# Patient Record
Sex: FEMALE | Race: WHITE | Employment: FULL TIME | URBAN - METROPOLITAN AREA
[De-identification: names, ages, dates, MRNs, and addresses within clinical notes are randomized per-mention and may not be internally consistent; named-entity substitution may affect disease eponyms.]

---

## 2017-10-14 ENCOUNTER — HOSPITAL ENCOUNTER (EMERGENCY)
Age: 27
Discharge: HOME | End: 2017-10-14
Attending: EMERGENCY MEDICINE
Payer: COMMERCIAL

## 2017-10-14 ENCOUNTER — APPOINTMENT (OUTPATIENT)
Dept: GENERAL RADIOLOGY | Age: 27
End: 2017-10-14
Attending: EMERGENCY MEDICINE
Payer: COMMERCIAL

## 2017-10-14 VITALS
HEART RATE: 80 BPM | HEIGHT: 64 IN | RESPIRATION RATE: 16 BRPM | WEIGHT: 230 LBS | DIASTOLIC BLOOD PRESSURE: 72 MMHG | SYSTOLIC BLOOD PRESSURE: 112 MMHG | TEMPERATURE: 97.8 F | OXYGEN SATURATION: 98 % | BODY MASS INDEX: 39.27 KG/M2

## 2017-10-14 DIAGNOSIS — M25.511 ACUTE PAIN OF RIGHT SHOULDER: Primary | ICD-10-CM

## 2017-10-14 DIAGNOSIS — T07.XXXA MULTIPLE ABRASIONS: ICD-10-CM

## 2017-10-14 PROCEDURE — 73030 X-RAY EXAM OF SHOULDER: CPT

## 2017-10-14 PROCEDURE — 74011250636 HC RX REV CODE- 250/636: Performed by: EMERGENCY MEDICINE

## 2017-10-14 PROCEDURE — L3670 SO ACRO/CLAV CAN WEB PRE OTS: HCPCS

## 2017-10-14 PROCEDURE — 90715 TDAP VACCINE 7 YRS/> IM: CPT | Performed by: EMERGENCY MEDICINE

## 2017-10-14 PROCEDURE — 99284 EMERGENCY DEPT VISIT MOD MDM: CPT | Performed by: EMERGENCY MEDICINE

## 2017-10-14 PROCEDURE — 90471 IMMUNIZATION ADMIN: CPT | Performed by: EMERGENCY MEDICINE

## 2017-10-14 RX ORDER — HYDROCODONE BITARTRATE AND ACETAMINOPHEN 5; 325 MG/1; MG/1
1 TABLET ORAL
Qty: 6 TAB | Refills: 0 | Status: SHIPPED | OUTPATIENT
Start: 2017-10-14 | End: 2017-10-16

## 2017-10-14 RX ADMIN — TETANUS TOXOID, REDUCED DIPHTHERIA TOXOID AND ACELLULAR PERTUSSIS VACCINE, ADSORBED 0.5 ML: 5; 2.5; 8; 8; 2.5 SUSPENSION INTRAMUSCULAR at 17:43
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
## 2017-10-14 NOTE — ED NOTES
I have reviewed discharge instructions with the patient. The patient verbalized understanding. Patient left ED via Discharge Method: ambulatory to Home with her friend Gomez rubio. Theo Silva Opportunity for questions and clarification provided. Patient given 1 scripts.

## 2017-10-14 NOTE — DISCHARGE INSTRUCTIONS
Musculoskeletal Pain: Care Instructions  Your Care Instructions  Different problems with the bones, muscles, nerves, ligaments, and tendons in the body can cause pain. One or more areas of your body may ache or burn. Or they may feel tired, stiff, or sore. The medical term for this type of pain is musculoskeletal pain. It can have many different causes. Sometimes the pain is caused by an injury such as a strain or sprain. Or you might have pain from using one part of your body in the same way over and over again. This is called overuse. In some cases, the cause of the pain is another health problem such as arthritis or fibromyalgia. The doctor will examine you and ask you questions about your health to help find the cause of your pain. Blood tests or imaging tests like an X-ray may also be helpful. But sometimes doctors can't find a cause of the pain. Treatment depends on your symptoms and the cause of the pain, if known. The doctor has checked you carefully, but problems can develop later. If you notice any problems or new symptoms, get medical treatment right away. Follow-up care is a key part of your treatment and safety. Be sure to make and go to all appointments, and call your doctor if you are having problems. It's also a good idea to know your test results and keep a list of the medicines you take. How can you care for yourself at home? · Rest until you feel better. · Do not do anything that makes the pain worse. Return to exercise gradually if you feel better and your doctor says it's okay. · Be safe with medicines. Read and follow all instructions on the label. ¨ If the doctor gave you a prescription medicine for pain, take it as prescribed. ¨ If you are not taking a prescription pain medicine, ask your doctor if you can take an over-the-counter medicine. · Put ice or a cold pack on the area for 10 to 20 minutes at a time to ease pain. Put a thin cloth between the ice and your skin.   When should you call for help? Call your doctor now or seek immediate medical care if:  · You have new pain, or your pain gets worse. · You have new symptoms such as a fever, a rash, or chills. Watch closely for changes in your health, and be sure to contact your doctor if:  · You do not get better as expected. Where can you learn more? Go to [employer]  Enter Q624 in the search box to learn more about \"Musculoskeletal Pain: Care Instructions. \"   © 3542-7686 [employer]. Care instructions adapted under license by [employer] (which disclaims liability or warranty for this information). This care instruction is for use with your licensed healthcare professional. If you have questions about a medical condition or this instruction, always ask your healthcare professional. [address] any warranty or liability for your use of this information. Content Version: 47.2.955092; Current as of: November 20, 2015             Joint Pain: Care Instructions  Your Care Instructions  Many people have small aches and pains from overuse or injury to muscles and joints. Joint injuries often happen during sports or recreation, work tasks, or projects around the home. An overuse injury can happen when you put too much stress on a joint or when you do an activity that stresses the joint over and over, such as using the computer or rowing a boat. You can take action at home to help your muscles and joints get better. You should feel better in 1 to 2 weeks, but it can take 3 months or more to heal completely. Follow-up care is a key part of your treatment and safety. Be sure to make and go to all appointments, and call your doctor if you are having problems. It's also a good idea to know your test results and keep a list of the medicines you take. How can you care for yourself at home? · Do not put weight on the injured joint for at least a day or two.   · For the first day or two after an injury, do not take hot showers or baths, and do not use hot packs. The heat could make swelling worse. · Put ice or a cold pack on the sore joint for 10 to 20 minutes at a time. Try to do this every 1 to 2 hours for the next 3 days (when you are awake) or until the swelling goes down. Put a thin cloth between the ice and your skin. · Wrap the injury in an elastic bandage. Do not wrap it too tightly because this can cause more swelling. · Prop up the sore joint on a pillow when you ice it or anytime you sit or lie down during the next 3 days. Try to keep it above the level of your heart. This will help reduce swelling. · Take an over-the-counter pain medicine, such as acetaminophen (Tylenol), ibuprofen (Advil, Motrin), or naproxen (Aleve). Read and follow all instructions on the label. · After 1 or 2 days of rest, begin moving the joint gently. While the joint is still healing, you can begin to exercise using activities that do not strain or hurt the painful joint. When should you call for help? Call your doctor now or seek immediate medical care if:  · You have signs of infection, such as:  ¨ Increased pain, swelling, warmth, and redness. ¨ Red streaks leading from the joint. ¨ A fever. Watch closely for changes in your health, and be sure to contact your doctor if:  · Your movement or symptoms are not getting better after 1 to 2 weeks of home treatment. Where can you learn more? Go to http://yovani-estrellita.info/. Enter P205 in the search box to learn more about \"Joint Pain: Care Instructions. \"  Current as of: March 21, 2017  Content Version: 11.3  © 7751-6203 [employer]. Care instructions adapted under license by [employer] (which disclaims liability or warranty for this information).  If you have questions about a medical condition or this instruction, always ask your healthcare professional. Pritesh Morrison disclaims any warranty or liability for your use of this information. Shoulder Pain: Care Instructions  Your Care Instructions    You can hurt your shoulder by using it too much during an activity, such as fishing or baseball. It can also happen as part of the everyday wear and tear of getting older. Shoulder injuries can be slow to heal, but your shoulder should get better with time. Your doctor may recommend a sling to rest your shoulder. If you have injured your shoulder, you may need testing and treatment. Follow-up care is a key part of your treatment and safety. Be sure to make and go to all appointments, and call your doctor if you are having problems. It's also a good idea to know your test results and keep a list of the medicines you take. How can you care for yourself at home? · Take pain medicines exactly as directed. ¨ If the doctor gave you a prescription medicine for pain, take it as prescribed. ¨ If you are not taking a prescription pain medicine, ask your doctor if you can take an over-the-counter medicine. ¨ Do not take two or more pain medicines at the same time unless the doctor told you to. Many pain medicines contain acetaminophen, which is Tylenol. Too much acetaminophen (Tylenol) can be harmful. · If your doctor recommends that you wear a sling, use it as directed. Do not take it off before your doctor tells you to. · Put ice or a cold pack on the sore area for 10 to 20 minutes at a time. Put a thin cloth between the ice and your skin. · If there is no swelling, you can put moist heat, a heating pad, or a warm cloth on your shoulder. Some doctors suggest alternating between hot and cold. · Rest your shoulder for a few days. If your doctor recommends it, you can then begin gentle exercise of the shoulder, but do not lift anything heavy. When should you call for help? Call 911 anytime you think you may need emergency care. For example, call if:  · You have chest pain or pressure.  This may occur with:  ¨ Sweating. ¨ Shortness of breath. ¨ Nausea or vomiting. ¨ Pain that spreads from the chest to the neck, jaw, or one or both shoulders or arms. ¨ Dizziness or lightheadedness. ¨ A fast or uneven pulse. After calling 911, chew 1 adult-strength aspirin. Wait for an ambulance. Do not try to drive yourself. · Your arm or hand is cool or pale or changes color. Call your doctor now or seek immediate medical care if:  · You have signs of infection, such as:  ¨ Increased pain, swelling, warmth, or redness in your shoulder. ¨ Red streaks leading from a place on your shoulder. ¨ Pus draining from an area of your shoulder. ¨ Swollen lymph nodes in your neck, armpits, or groin. ¨ A fever. Watch closely for changes in your health, and be sure to contact your doctor if:  · You cannot use your shoulder. · Your shoulder does not get better as expected. Where can you learn more? Go to http://yovani-estrellita.info/. Enter S001 in the search box to learn more about \"Shoulder Pain: Care Instructions. \"  Current as of: March 21, 2017  Content Version: 11.3  © 8434-5847 [employer]. Care instructions adapted under license by [employer] (which disclaims liability or warranty for this information). If you have questions about a medical condition or this instruction, always ask your healthcare professional. [place] [address] any warranty or liability for your use of this information.

## 2018-04-11 PROBLEM — J30.9 ALLERGIC RHINITIS: Status: ACTIVE | Noted: 2018-04-11

## 2018-04-11 PROBLEM — K21.9 GERD WITHOUT ESOPHAGITIS: Status: ACTIVE | Noted: 2018-04-11

## 2018-04-11 PROBLEM — F17.200 TOBACCO DEPENDENCE: Status: ACTIVE | Noted: 2018-04-11

## 2018-04-11 PROBLEM — E66.01 OBESITY, MORBID (HCC): Status: ACTIVE | Noted: 2018-04-11

## 2020-07-22 ENCOUNTER — HOSPITAL ENCOUNTER (EMERGENCY)
Age: 30
Discharge: HOME | End: 2020-07-22
Attending: EMERGENCY MEDICINE
Payer: SUBSIDIZED

## 2020-07-22 VITALS
SYSTOLIC BLOOD PRESSURE: 148 MMHG | RESPIRATION RATE: 16 BRPM | OXYGEN SATURATION: 99 % | HEART RATE: 87 BPM | WEIGHT: 274 LBS | HEIGHT: 65 IN | BODY MASS INDEX: 45.65 KG/M2 | TEMPERATURE: 98.1 F | DIASTOLIC BLOOD PRESSURE: 85 MMHG

## 2020-07-22 DIAGNOSIS — H61.21 IMPACTED CERUMEN OF RIGHT EAR: Primary | ICD-10-CM

## 2020-07-22 LAB
ANION GAP SERPL CALC-SCNC: 5 MMOL/L (ref 7–16)
BASOPHILS # BLD: 0.1 K/UL (ref 0–0.2)
BASOPHILS NFR BLD: 1 % (ref 0–2)
BUN SERPL-MCNC: 6 MG/DL (ref 6–23)
CALCIUM SERPL-MCNC: 9 MG/DL (ref 8.3–10.4)
CHLORIDE SERPL-SCNC: 105 MMOL/L (ref 98–107)
CO2 SERPL-SCNC: 28 MMOL/L (ref 21–32)
CREAT SERPL-MCNC: 0.75 MG/DL (ref 0.6–1)
DIFFERENTIAL METHOD BLD: ABNORMAL
EOSINOPHIL # BLD: 0.3 K/UL (ref 0–0.8)
EOSINOPHIL NFR BLD: 2 % (ref 0.5–7.8)
ERYTHROCYTE [DISTWIDTH] IN BLOOD BY AUTOMATED COUNT: 12.9 % (ref 11.9–14.6)
GLUCOSE SERPL-MCNC: 79 MG/DL (ref 65–100)
HCG UR QL: NEGATIVE
HCT VFR BLD AUTO: 44.3 % (ref 35.8–46.3)
HGB BLD-MCNC: 15.2 G/DL (ref 11.7–15.4)
IMM GRANULOCYTES # BLD AUTO: 0.1 K/UL (ref 0–0.5)
IMM GRANULOCYTES NFR BLD AUTO: 1 % (ref 0–5)
LYMPHOCYTES # BLD: 2.5 K/UL (ref 0.5–4.6)
LYMPHOCYTES NFR BLD: 22 % (ref 13–44)
MCH RBC QN AUTO: 28.8 PG (ref 26.1–32.9)
MCHC RBC AUTO-ENTMCNC: 34.3 G/DL (ref 31.4–35)
MCV RBC AUTO: 84.1 FL (ref 79.6–97.8)
MONOCYTES # BLD: 0.8 K/UL (ref 0.1–1.3)
MONOCYTES NFR BLD: 7 % (ref 4–12)
NEUTS SEG # BLD: 7.8 K/UL (ref 1.7–8.2)
NEUTS SEG NFR BLD: 68 % (ref 43–78)
NRBC # BLD: 0 K/UL (ref 0–0.2)
PLATELET # BLD AUTO: 335 K/UL (ref 150–450)
PMV BLD AUTO: 9.2 FL (ref 9.4–12.3)
POTASSIUM SERPL-SCNC: 3.6 MMOL/L (ref 3.5–5.1)
RBC # BLD AUTO: 5.27 M/UL (ref 4.05–5.2)
SODIUM SERPL-SCNC: 138 MMOL/L (ref 136–145)
WBC # BLD AUTO: 11.5 K/UL (ref 4.3–11.1)

## 2020-07-22 PROCEDURE — 85025 COMPLETE CBC W/AUTO DIFF WBC: CPT

## 2020-07-22 PROCEDURE — 99284 EMERGENCY DEPT VISIT MOD MDM: CPT

## 2020-07-22 PROCEDURE — 81025 URINE PREGNANCY TEST: CPT

## 2020-07-22 PROCEDURE — 75810000150 HC RMVL IMPACTED CERUMEN 1 / 2

## 2020-07-22 PROCEDURE — 81003 URINALYSIS AUTO W/O SCOPE: CPT

## 2020-07-22 PROCEDURE — 74011250636 HC RX REV CODE- 250/636: Performed by: EMERGENCY MEDICINE

## 2020-07-22 PROCEDURE — 80048 BASIC METABOLIC PNL TOTAL CA: CPT

## 2020-07-22 RX ADMIN — SODIUM CHLORIDE 500 ML: 900 INJECTION, SOLUTION INTRAVENOUS at 14:28

## 2020-07-22 NOTE — ED PROVIDER NOTES
The patient is a 82-year-old female presenting to the emergency department today complaining of inattention for the last week. The patient states that she is staring off into space and having trouble focusing. The patient says that she is aware of what is going on around her but it is just difficult to function. The patient says she is felt a little bit week. She has had ear infections in the past which have caused her balance to get off but she went to an urgent care 2 days ago and was evaluated and told that everything looked okay. The patient says she has not had a menstrual cycle for the last 3 months and is sexually active only with women. The patient says that for the last year she had had very irregular menstrual cycles but prior to that had a history of irregular menses. The patient says that she is not having any burning with urination or blood in her urine. She denies any abdominal pain nausea or vomiting. She does complain of some muscle strain in her neck from the way she sleeps which comes and goes but denies any fevers or chills. She works as a  and does not have any known known interactions with anyone who has been sick with coronavirus           Past Medical History:   Diagnosis Date    Cancer ([address] 2014    thyroid    Obesity (BMI 30-39. 9)     Pilonidal cyst        Past Surgical History:   Procedure Laterality Date    HX APPENDECTOMY  age 25, 2011?     HX HEENT  age 5    tubes in ears    HX HEENT  age15    wisdom teeth ext    HX OTHER SURGICAL  2016    pilonidal cyst removed         Family History:   Problem Relation Age of Onset    Diabetes Mother     Hypertension Mother     Lung Disease Mother     Hypertension Father     Cancer Maternal Grandmother         stomach    Cancer Paternal Grandfather         colon       Social History     Socioeconomic History    Marital status:      Spouse name: Not on file    Number of children: Not on file    Years of education: Not on file    Highest education level: Not on file   Occupational History    Not on file   Social Needs    Financial resource strain: Not on file    Food insecurity     Worry: Not on file     Inability: Not on file    Transportation needs     Medical: Not on file     Non-medical: Not on file   Tobacco Use    Smoking status: Current Every Day Smoker     Packs/day: 0.50     Years: 3.00     Pack years: 1.50    Smokeless tobacco: Never Used   Substance and Sexual Activity    Alcohol use: Yes     Alcohol/week: 0.0 standard drinks     Comment: rarely    Drug use: No    Sexual activity: Not on file   Lifestyle    Physical activity     Days per week: Not on file     Minutes per session: Not on file    Stress: Not on file   Relationships    Social connections     Talks on phone: Not on file     Gets together: Not on file     Attends Cheondoism service: Not on file     Active member of club or organization: Not on file     Attends meetings of clubs or organizations: Not on file     Relationship status: Not on file    Intimate partner violence     Fear of current or ex partner: Not on file     Emotionally abused: Not on file     Physically abused: Not on file     Forced sexual activity: Not on file   Other Topics Concern    Not on file   Social History Narrative    Not on file         ALLERGIES: Pcn [penicillins]; Amoxicillin; and Hydrocodone    Review of Systems   Constitutional: Positive for activity change and fatigue. Negative for chills and fever. HENT: Positive for ear pain. Eyes: Negative. Respiratory: Negative. Cardiovascular: Negative. Gastrointestinal: Negative. Genitourinary: Negative. Musculoskeletal: Negative. Skin: Negative. Neurological: Positive for dizziness. Hematological: Negative. All other systems reviewed and are negative.       Vitals:    07/22/20 1342   BP: 148/89   Pulse: 95   Resp: 16   Temp: 98.1 °F (36.7 °C)   SpO2: 97%   Weight: 124.3 kg (274 lb)   Height: 5' 5\" (1.651 m)            Physical Exam     GENERAL:The patient is obese, and well-hydrated. No acute distress  VITAL SIGNS: Heart rate, blood pressure, respiratory rate reviewed as recorded in  nurse's notes  EYES: Pupils reactive. Extraocular motion intact. No conjunctival redness or drainage. EARS: Cerumen impaction in the right ear with out ability to visualize the tympanic membrane. Left external auditory canal is clear with scant cerumen present. Tympanic membrane is clear with no retraction no erythema. NOSE: Bilateral erythema with thin drainage appreciated. No epistaxis present  MOUTH: Uvula is midline and mildly edematous. Floor the mouth is soft and there is no tonsillar enlargement present. NECK: Positive tenderness to palpation with enlargement of the right submandibular lymph node. Trachea midline. LUNGS: No accessory muscle use  CARDIOVASCULAR: Regular rate and rhythm  EXTREMITIES: Pt moving all 4 extremities with out limitations. Normal muscle tone. NEUROLOGIC: Cranial nerve exam reveals face is symmetrical, tongue is midline  speech is clear. No focal deficits noted. Negative pronator drift in the arms and legs. Patient is able do finger-to-nose without difficulty. SKIN: No rash or petechiae. Good skin turgor palpated. PSYCHIATRIC: Alert and oriented. Appropriate behavior and judgment.       MDM  Number of Diagnoses or Management Options  Diagnosis management comments: Viral infection, croup (bronchiolitis), mononucleosis    Acute sinusitis, chronic sinusitis,    OM, serous OM, otitis externa,    Pharyngitis, Strep Throat, adenitis, uvulitis, rhinitis, postnasal drainage, nasal congestion    Bronchitis, pneumonia         Amount and/or Complexity of Data Reviewed  Clinical lab tests: reviewed and ordered  Tests in the medicine section of CPT®: ordered and reviewed      ED Course as of Jul 22 1459 Wed Jul 22, 2020   1454 Trying to irrigate the patient's ear was unsuccessful. She still has a large cerumen impaction against the right tympanic membrane. I tried to remove this with a curette but the patient could not tolerate it. I talked to her about using hydrogen peroxide and the ear wicking candles and if this is unsuccessful encouraged her to follow-up with ENT.     [KH]      ED Course User Index  [KH] Madhu Poor, DO       Procedures

## 2020-07-22 NOTE — ED TRIAGE NOTES
Pt states she has been having a headache with some head pressure and feeling out of it for about two weeks. States she has had some intermittent pain in ears and in left side of neck. Jennifer Scott

## 2020-07-22 NOTE — ED NOTES
I have reviewed discharge instructions with the patient. The patient verbalized understanding. Patient left ED via Discharge Method: ambulatory to Home with self if not calling someone for transport home. Patient was verbally informed if she felt dizzy after flushing her ears she needed to call someone to come  her home which patient advises is about 30 minutes away. Opportunity for questions and clarification provided. Patient given 0 scripts. To continue your aftercare when you leave the hospital, you may receive an automated call from our care team to check in on how you are doing. This is a free service and part of our promise to provide the best care and service to meet your aftercare needs.  If you have questions, or wish to unsubscribe from this service please call [phone]. Thank you for Choosing our [facility].

## 2020-07-22 NOTE — DISCHARGE INSTRUCTIONS
Continue trying to use the hydroperoxide and the ear wax wicking candles or Cerumenex to remove the cerumen impaction from the right ear.   If this is unsuccessful you may need to follow-up with the ENT specialist.

## 2021-02-15 PROBLEM — R73.03 PREDIABETES: Status: ACTIVE | Noted: 2021-02-15

## 2021-04-23 ENCOUNTER — HOSPITAL ENCOUNTER (OUTPATIENT)
Dept: LAB | Age: 31
Discharge: HOME | End: 2021-04-23

## 2021-04-23 PROCEDURE — 88305 TISSUE EXAM BY PATHOLOGIST: CPT

## 2021-04-23 PROCEDURE — 88312 SPECIAL STAINS GROUP 1: CPT

## 2021-07-27 ENCOUNTER — HOSPITAL ENCOUNTER (INPATIENT)
Age: 31
LOS: 2 days | Discharge: HOME | DRG: 194 | End: 2021-07-29
Attending: EMERGENCY MEDICINE | Admitting: INTERNAL MEDICINE
Payer: COMMERCIAL

## 2021-07-27 ENCOUNTER — APPOINTMENT (OUTPATIENT)
Dept: GENERAL RADIOLOGY | Age: 31
DRG: 194 | End: 2021-07-27
Attending: PHYSICIAN ASSISTANT
Payer: COMMERCIAL

## 2021-07-27 DIAGNOSIS — F41.9 ANXIETY: ICD-10-CM

## 2021-07-27 DIAGNOSIS — J18.9 COMMUNITY ACQUIRED PNEUMONIA, UNSPECIFIED LATERALITY: ICD-10-CM

## 2021-07-27 DIAGNOSIS — Z20.822 COVID-19 RULED OUT: Primary | ICD-10-CM

## 2021-07-27 LAB
ALBUMIN SERPL-MCNC: 3.7 G/DL (ref 3.5–5)
ALBUMIN/GLOB SERPL: 0.9 {RATIO} (ref 1.2–3.5)
ALP SERPL-CCNC: 69 U/L (ref 50–130)
ALT SERPL-CCNC: 27 U/L (ref 12–65)
ANION GAP SERPL CALC-SCNC: 6 MMOL/L (ref 7–16)
AST SERPL-CCNC: 11 U/L (ref 15–37)
B PERT DNA SPEC QL NAA+PROBE: NOT DETECTED
BASOPHILS # BLD: 0.1 K/UL (ref 0–0.2)
BASOPHILS NFR BLD: 1 % (ref 0–2)
BILIRUB SERPL-MCNC: 0.5 MG/DL (ref 0.2–1.1)
BORDETELLA PARAPERTUSSIS PCR, BORPAR: NOT DETECTED
BUN SERPL-MCNC: 13 MG/DL (ref 6–23)
C PNEUM DNA SPEC QL NAA+PROBE: NOT DETECTED
CALCIUM SERPL-MCNC: 8.6 MG/DL (ref 8.3–10.4)
CHLORIDE SERPL-SCNC: 108 MMOL/L (ref 98–107)
CO2 SERPL-SCNC: 26 MMOL/L (ref 21–32)
COVID-19 RAPID TEST, COVR: NOT DETECTED
CREAT SERPL-MCNC: 0.76 MG/DL (ref 0.6–1)
DIFFERENTIAL METHOD BLD: ABNORMAL
EOSINOPHIL # BLD: 0.4 K/UL (ref 0–0.8)
EOSINOPHIL NFR BLD: 3 % (ref 0.5–7.8)
ERYTHROCYTE [DISTWIDTH] IN BLOOD BY AUTOMATED COUNT: 14.6 % (ref 11.9–14.6)
FLUAV SUBTYP SPEC NAA+PROBE: NOT DETECTED
FLUBV RNA SPEC QL NAA+PROBE: NOT DETECTED
GLOBULIN SER CALC-MCNC: 3.9 G/DL (ref 2.3–3.5)
GLUCOSE SERPL-MCNC: 85 MG/DL (ref 65–100)
HADV DNA SPEC QL NAA+PROBE: NOT DETECTED
HCOV 229E RNA SPEC QL NAA+PROBE: NOT DETECTED
HCOV HKU1 RNA SPEC QL NAA+PROBE: NOT DETECTED
HCOV NL63 RNA SPEC QL NAA+PROBE: NOT DETECTED
HCOV OC43 RNA SPEC QL NAA+PROBE: NOT DETECTED
HCT VFR BLD AUTO: 41.1 % (ref 35.8–46.3)
HGB BLD-MCNC: 13.5 G/DL (ref 11.7–15.4)
HMPV RNA SPEC QL NAA+PROBE: NOT DETECTED
HPIV1 RNA SPEC QL NAA+PROBE: NOT DETECTED
HPIV2 RNA SPEC QL NAA+PROBE: NOT DETECTED
HPIV3 RNA SPEC QL NAA+PROBE: NOT DETECTED
HPIV4 RNA SPEC QL NAA+PROBE: NOT DETECTED
IMM GRANULOCYTES # BLD AUTO: 0.1 K/UL (ref 0–0.5)
IMM GRANULOCYTES NFR BLD AUTO: 1 % (ref 0–5)
LIPASE SERPL-CCNC: 30 U/L (ref 73–393)
LYMPHOCYTES # BLD: 2 K/UL (ref 0.5–4.6)
LYMPHOCYTES NFR BLD: 15 % (ref 13–44)
M PNEUMO DNA SPEC QL NAA+PROBE: NOT DETECTED
MCH RBC QN AUTO: 26.6 PG (ref 26.1–32.9)
MCHC RBC AUTO-ENTMCNC: 32.8 G/DL (ref 31.4–35)
MCV RBC AUTO: 81.1 FL (ref 79.6–97.8)
MONOCYTES # BLD: 1.1 K/UL (ref 0.1–1.3)
MONOCYTES NFR BLD: 9 % (ref 4–12)
NEUTS SEG # BLD: 9.3 K/UL (ref 1.7–8.2)
NEUTS SEG NFR BLD: 72 % (ref 43–78)
NRBC # BLD: 0 K/UL (ref 0–0.2)
PLATELET # BLD AUTO: 307 K/UL (ref 150–450)
PMV BLD AUTO: 9.1 FL (ref 9.4–12.3)
POTASSIUM SERPL-SCNC: 3.8 MMOL/L (ref 3.5–5.1)
PROT SERPL-MCNC: 7.6 G/DL (ref 6.3–8.2)
RBC # BLD AUTO: 5.07 M/UL (ref 4.05–5.2)
RSV RNA SPEC QL NAA+PROBE: NOT DETECTED
RV+EV RNA SPEC QL NAA+PROBE: DETECTED
SARS-COV-2 PCR, COVPCR: NOT DETECTED
SARS-COV-2, COV2: NORMAL
SODIUM SERPL-SCNC: 140 MMOL/L (ref 136–145)
SOURCE, COVRS: NORMAL
WBC # BLD AUTO: 12.9 K/UL (ref 4.3–11.1)

## 2021-07-27 PROCEDURE — 99283 EMERGENCY DEPT VISIT LOW MDM: CPT

## 2021-07-27 PROCEDURE — 65270000029 HC RM PRIVATE

## 2021-07-27 PROCEDURE — 0202U NFCT DS 22 TRGT SARS-COV-2: CPT

## 2021-07-27 PROCEDURE — 87635 SARS-COV-2 COVID-19 AMP PRB: CPT

## 2021-07-27 PROCEDURE — 77010033678 HC OXYGEN DAILY

## 2021-07-27 PROCEDURE — 85025 COMPLETE CBC W/AUTO DIFF WBC: CPT

## 2021-07-27 PROCEDURE — 74011250636 HC RX REV CODE- 250/636: Performed by: INTERNAL MEDICINE

## 2021-07-27 PROCEDURE — 36415 COLL VENOUS BLD VENIPUNCTURE: CPT

## 2021-07-27 PROCEDURE — 94760 N-INVAS EAR/PLS OXIMETRY 1: CPT

## 2021-07-27 PROCEDURE — 74011000258 HC RX REV CODE- 258: Performed by: INTERNAL MEDICINE

## 2021-07-27 PROCEDURE — 87070 CULTURE OTHR SPECIMN AEROBIC: CPT

## 2021-07-27 PROCEDURE — 80053 COMPREHEN METABOLIC PANEL: CPT

## 2021-07-27 PROCEDURE — 74011250636 HC RX REV CODE- 250/636: Performed by: PHYSICIAN ASSISTANT

## 2021-07-27 PROCEDURE — 71046 X-RAY EXAM CHEST 2 VIEWS: CPT

## 2021-07-27 PROCEDURE — 94660 CPAP INITIATION&MGMT: CPT

## 2021-07-27 PROCEDURE — 87040 BLOOD CULTURE FOR BACTERIA: CPT

## 2021-07-27 PROCEDURE — 83690 ASSAY OF LIPASE: CPT

## 2021-07-27 PROCEDURE — 96374 THER/PROPH/DIAG INJ IV PUSH: CPT

## 2021-07-27 PROCEDURE — U0005 INFEC AGEN DETEC AMPLI PROBE: HCPCS

## 2021-07-27 PROCEDURE — 74011250637 HC RX REV CODE- 250/637: Performed by: INTERNAL MEDICINE

## 2021-07-27 RX ORDER — FAMOTIDINE 20 MG/1
20 TABLET, FILM COATED ORAL 2 TIMES DAILY
Status: DISCONTINUED | OUTPATIENT
Start: 2021-07-27 | End: 2021-07-29 | Stop reason: HOSPADM

## 2021-07-27 RX ORDER — SODIUM CHLORIDE 0.9 % (FLUSH) 0.9 %
5-40 SYRINGE (ML) INJECTION EVERY 8 HOURS
Status: DISCONTINUED | OUTPATIENT
Start: 2021-07-27 | End: 2021-07-29 | Stop reason: HOSPADM

## 2021-07-27 RX ORDER — ENOXAPARIN SODIUM 100 MG/ML
40 INJECTION SUBCUTANEOUS EVERY 12 HOURS
Status: DISCONTINUED | OUTPATIENT
Start: 2021-07-27 | End: 2021-07-29 | Stop reason: HOSPADM

## 2021-07-27 RX ORDER — ACETAMINOPHEN 325 MG/1
650 TABLET ORAL
Status: DISCONTINUED | OUTPATIENT
Start: 2021-07-27 | End: 2021-07-29 | Stop reason: HOSPADM

## 2021-07-27 RX ORDER — SODIUM CHLORIDE 0.9 % (FLUSH) 0.9 %
5-40 SYRINGE (ML) INJECTION AS NEEDED
Status: DISCONTINUED | OUTPATIENT
Start: 2021-07-27 | End: 2021-07-29 | Stop reason: HOSPADM

## 2021-07-27 RX ORDER — ONDANSETRON 4 MG/1
4 TABLET, ORALLY DISINTEGRATING ORAL
Status: DISCONTINUED | OUTPATIENT
Start: 2021-07-27 | End: 2021-07-29 | Stop reason: HOSPADM

## 2021-07-27 RX ORDER — POLYETHYLENE GLYCOL 3350 17 G/17G
17 POWDER, FOR SOLUTION ORAL DAILY PRN
Status: DISCONTINUED | OUTPATIENT
Start: 2021-07-27 | End: 2021-07-29 | Stop reason: HOSPADM

## 2021-07-27 RX ORDER — DOXYCYCLINE 100 MG/1
100 CAPSULE ORAL EVERY 12 HOURS
Status: DISCONTINUED | OUTPATIENT
Start: 2021-07-27 | End: 2021-07-29 | Stop reason: HOSPADM

## 2021-07-27 RX ORDER — ACETAMINOPHEN 650 MG/1
650 SUPPOSITORY RECTAL
Status: DISCONTINUED | OUTPATIENT
Start: 2021-07-27 | End: 2021-07-29 | Stop reason: HOSPADM

## 2021-07-27 RX ORDER — ONDANSETRON 2 MG/ML
4 INJECTION INTRAMUSCULAR; INTRAVENOUS
Status: COMPLETED | OUTPATIENT
Start: 2021-07-27 | End: 2021-07-27

## 2021-07-27 RX ORDER — FLUTICASONE PROPIONATE 50 MCG
2 SPRAY, SUSPENSION (ML) NASAL DAILY
Status: DISCONTINUED | OUTPATIENT
Start: 2021-07-28 | End: 2021-07-29 | Stop reason: HOSPADM

## 2021-07-27 RX ORDER — ONDANSETRON 2 MG/ML
4 INJECTION INTRAMUSCULAR; INTRAVENOUS
Status: DISCONTINUED | OUTPATIENT
Start: 2021-07-27 | End: 2021-07-29 | Stop reason: HOSPADM

## 2021-07-27 RX ORDER — ESCITALOPRAM OXALATE 10 MG/1
20 TABLET ORAL DAILY
Status: DISCONTINUED | OUTPATIENT
Start: 2021-07-28 | End: 2021-07-29 | Stop reason: HOSPADM

## 2021-07-27 RX ADMIN — Medication 10 ML: at 22:00

## 2021-07-27 RX ADMIN — FAMOTIDINE 20 MG: 20 TABLET ORAL at 20:05

## 2021-07-27 RX ADMIN — SODIUM CHLORIDE 1000 ML: 900 INJECTION, SOLUTION INTRAVENOUS at 11:54

## 2021-07-27 RX ADMIN — ACETAMINOPHEN 650 MG: 325 TABLET, FILM COATED ORAL at 18:41

## 2021-07-27 RX ADMIN — ONDANSETRON 4 MG: 2 INJECTION INTRAMUSCULAR; INTRAVENOUS at 18:42

## 2021-07-27 RX ADMIN — CEFTRIAXONE SODIUM 2 G: 2 INJECTION, POWDER, FOR SOLUTION INTRAMUSCULAR; INTRAVENOUS at 17:53

## 2021-07-27 RX ADMIN — DOXYCYCLINE HYCLATE 100 MG: 100 CAPSULE ORAL at 20:05

## 2021-07-27 RX ADMIN — ONDANSETRON 4 MG: 2 INJECTION INTRAMUSCULAR; INTRAVENOUS at 11:55

## 2021-07-27 RX ADMIN — ENOXAPARIN SODIUM 40 MG: 40 INJECTION SUBCUTANEOUS at 17:54

## 2021-07-27 NOTE — ACP (ADVANCE CARE PLANNING)
Advance Care Planning     Advance Care Planning Activator (Inpatient)  Conversation Note      Date of ACP Conversation: 07/27/21     Conversation Conducted with:   Patient with Decision Making Capacity. Pt declined having the HCPOA discussion at this time.       ACP Activator: Oneyda Mo RN

## 2021-07-27 NOTE — ED PROVIDER NOTES
Medical Screening Examination  CC: Patient presents with:  Vomiting  Cough    Brief HPI: Cough shortness of breath  PE: Wheezing lung sounds bilaterally  I have performed a rapid medical evaluation on the patient. Orders initiated and communicated with the nursing staff. Patient evaluated initially in triage care will now be transferred to the attending physician in the emergency department. Denyse Fleischer, PA 9:53 AM     31 female patient presents emergency department with chest pain, vomiting body aches chills fever cough denies diarrhea. The history is provided by the patient. Vomiting   This is a new problem. The problem has not changed since onset. There has been a fever of 100 - 100.9 F. Associated symptoms include cough. Pertinent negatives include no chills, no fever, no sweats, no abdominal pain, no diarrhea, no headaches, no arthralgias, no myalgias, no URI and no headaches. The patient is not pregnant. Risk factors include ill contacts. Her pertinent negatives include no irritable bowel syndrome, no inflammatory bowel disease, no short gut syndrome, no bowel resection, no recent abdominal surgery, no gastric bypass and no DM. Cough  Associated symptoms include vomiting. Pertinent negatives include no chills, no sweats, no headaches, no myalgias and no shortness of breath. Past Medical History:   Diagnosis Date    Cancer [facility]) 2014    thyroid    Obesity (BMI 30-39. 9)     Pilonidal cyst        Past Surgical History:   Procedure Laterality Date    HX APPENDECTOMY  age 25, 2011?     HX HEENT  age 5    tubes in ears    HX HEENT  age15    wisdom teeth ext    HX OTHER SURGICAL  2016    pilonidal cyst removed         Family History:   Problem Relation Age of Onset    Diabetes Mother     Hypertension Mother     Lung Disease Mother     Hypertension Father     Cancer Maternal Grandmother         stomach    Cancer Paternal Grandfather         colon       Social History     Socioeconomic History    Marital status: SINGLE     Spouse name: Not on file    Number of children: Not on file    Years of education: Not on file    Highest education level: Not on file   Occupational History    Not on file   Tobacco Use    Smoking status: Current Every Day Smoker     Packs/day: 0.50     Years: 3.00     Pack years: 1.50    Smokeless tobacco: Never Used   Substance and Sexual Activity    Alcohol use: Yes     Alcohol/week: 0.0 standard drinks     Comment: rarely    Drug use: No    Sexual activity: Not on file   Other Topics Concern    Not on file   Social History Narrative    Not on file     Social Determinants of Health     Financial Resource Strain:     Difficulty of Paying Living Expenses:    Food Insecurity:     Worried About Running Out of Food in the Last Year:     [address] in the Last Year:    Transportation Needs:     Lack of Transportation (Medical):  Lack of Transportation (Non-Medical):    Physical Activity:     Days of Exercise per Week:     Minutes of Exercise per Session:    Stress:     Feeling of Stress :    Social Connections:     Frequency of Communication with Friends and Family:     Frequency of Social Gatherings with Friends and Family:     Attends Oriental orthodox Services:     Active Member of Clubs or Organizations:     Attends Club or Organization Meetings:     Marital Status:    Intimate Partner Violence:     Fear of Current or Ex-Partner:     Emotionally Abused:     Physically Abused:     Sexually Abused: ALLERGIES: Pcn [penicillins], Amoxicillin, and Hydrocodone    Review of Systems   Constitutional: Negative. Negative for activity change, appetite change, chills and fever. HENT: Negative. Respiratory: Positive for cough. Negative for shortness of breath. Cardiovascular: Negative. Gastrointestinal: Positive for vomiting. Negative for abdominal pain and diarrhea. Genitourinary: Negative. Musculoskeletal: Negative.   Negative for arthralgias, gait problem and myalgias. Neurological: Negative. Negative for headaches. All other systems reviewed and are negative. Vitals:    07/27/21 0943   BP: 131/77   Pulse: (!) 104   Resp: 18   Temp: 99.4 °F (37.4 °C)   SpO2: 92%   Weight: 113.4 kg (250 lb)   Height: 5' 4\" (1.626 m)            Physical Exam  Vitals and nursing note reviewed. Constitutional:       General: She is not in acute distress. Appearance: Normal appearance. She is not ill-appearing, toxic-appearing or diaphoretic. HENT:      Head: Normocephalic and atraumatic. Eyes:      Conjunctiva/sclera: Conjunctivae normal.   Cardiovascular:      Rate and Rhythm: Normal rate and regular rhythm. Pulses: Normal pulses. Heart sounds: Normal heart sounds. Pulmonary:      Effort: Pulmonary effort is normal. No respiratory distress. Breath sounds: Decreased air movement present. No stridor or transmitted upper airway sounds. Examination of the right-middle field reveals wheezing. Examination of the left-middle field reveals wheezing. Wheezing present. No decreased breath sounds, rhonchi or rales. Chest:      Chest wall: No tenderness. Skin:     General: Skin is warm and dry. Neurological:      General: No focal deficit present. Mental Status: She is alert and oriented to person, place, and time. Mental status is at baseline. MDM  Number of Diagnoses or Management Options  Diagnosis management comments: Patient presents emerge department with symptoms of Covid. We will test her for Covid chest x-ray shows pneumonia. The patient's oxygen dropped to 89% while walking 40 feet.   Most likely patient will be admitted       Amount and/or Complexity of Data Reviewed  Tests in the radiology section of CPT®: ordered and reviewed  Discuss the patient with other providers: yes    Risk of Complications, Morbidity, and/or Mortality  Presenting problems: moderate  Diagnostic procedures: low  Management options: low    Patient Progress  Patient progress: stable         Procedures

## 2021-07-27 NOTE — PROGRESS NOTES
Care Management Interventions  PCP Verified by CM: Yes (Dr. Dacia Cadena @ [facility] [address])  Mode of Transport at Discharge:  (family)  Transition of Care Consult (CM Consult): Discharge Planning  Current Support Network: Lives with Spouse (wife/Mena Gomez #[phone])  Confirm Follow Up Transport: Family  Discharge Location  Discharge Placement: Home  Visited with pt to establish prior to admission baseline and discuss their anticipated goals at time of d/c. Pt lives at home with wife/Mena, she is inde with ADL's, currently on 2L NC, does not wear O2 at home. Has a PCP and Rx are filled at [facility]. No needs at this time and goal is to return home with family at d/c. CM to follow.

## 2021-07-27 NOTE — PROGRESS NOTES
TRANSFER - IN REPORT:    Verbal report received from DONTE Fagan(name) on [facility] Page  being received from ER(unit) for routine progression of care      Report consisted of patients Situation, Background, Assessment and   Recommendations(SBAR). Information from the following report(s) SBAR, Kardex, ED Summary, [facility] and Recent Results was reviewed with the receiving nurse. Opportunity for questions and clarification was provided. Assessment completed upon patients arrival to unit and care assumed.

## 2021-07-27 NOTE — PROGRESS NOTES
Care Management Interventions  PCP Verified by CM: Yes (Dr. Uribe Stamps @ [facility] [place])  Mode of Transport at Discharge:  (family)  Transition of Care Consult (CM Consult): Discharge Planning  Current Support Network: Lives with Spouse (wife/Mena Gomez #[phone])  Confirm Follow Up Transport: Family  Discharge Location  Discharge Placement: Home  Visited with pt to establish prior to admission baseline and discuss their anticipated goals at time of d/c. Pt lives at home with wife/Mena, she is inde with ADL's, currently on 2L NC, does not wear O2 at home. Has a PCP and Rx are filled at [facility]. No needs at this time and goal is to return home with family at d/c. CM to follow.

## 2021-07-27 NOTE — PROGRESS NOTES
TRANSFER - IN REPORT:    Verbal report received from GraciaRN(name) on [facility] Page  being received from ER(unit) for routine progression of care      Report consisted of patients Situation, Background, Assessment and   Recommendations(SBAR). Information from the following report(s) SBAR, Kardex, ED Summary, [facility] and Recent Results was reviewed with the receiving nurse. Opportunity for questions and clarification was provided. Assessment completed upon patients arrival to unit and care assumed.

## 2021-07-27 NOTE — ACP (ADVANCE CARE PLANNING)
Advance Care Planning     Advance Care Planning Activator (Inpatient)  Conversation Note      Date of ACP Conversation: 07/27/21     Conversation Conducted with:   Patient with Decision Making Capacity. Pt declined having the HCPOA discussion at this time.       ACP Activator: Brenden Reveles RN

## 2021-07-27 NOTE — ED PROVIDER NOTES
Medical Screening Examination  CC: Patient presents with:  Vomiting  Cough    Brief HPI: Cough shortness of breath  PE: Wheezing lung sounds bilaterally  I have performed a rapid medical evaluation on the patient. Orders initiated and communicated with the nursing staff. Patient evaluated initially in triage care will now be transferred to the attending physician in the emergency department. AZAM Christensen 9:53 AM     31 female patient presents emergency department with chest pain, vomiting body aches chills fever cough denies diarrhea. The history is provided by the patient. Vomiting   This is a new problem. The problem has not changed since onset. There has been a fever of 100 - 100.9 F. Associated symptoms include cough. Pertinent negatives include no chills, no fever, no sweats, no abdominal pain, no diarrhea, no headaches, no arthralgias, no myalgias, no URI and no headaches. The patient is not pregnant. Risk factors include ill contacts. Her pertinent negatives include no irritable bowel syndrome, no inflammatory bowel disease, no short gut syndrome, no bowel resection, no recent abdominal surgery, no gastric bypass and no DM. Cough  Associated symptoms include vomiting. Pertinent negatives include no chills, no sweats, no headaches, no myalgias and no shortness of breath. Past Medical History:   Diagnosis Date    Cancer [facility]) 2014    thyroid    Obesity (BMI 30-39. 9)     Pilonidal cyst        Past Surgical History:   Procedure Laterality Date    HX APPENDECTOMY  age 25, 2011?     HX HEENT  age 5    tubes in ears    HX HEENT  age17    wisdom teeth ext    HX OTHER SURGICAL  2016    pilonidal cyst removed         Family History:   Problem Relation Age of Onset    Diabetes Mother     Hypertension Mother     Lung Disease Mother     Hypertension Father     Cancer Maternal Grandmother         stomach    Cancer Paternal Grandfather         colon       Social History     Socioeconomic History    Marital status: SINGLE     Spouse name: Not on file    Number of children: Not on file    Years of education: Not on file    Highest education level: Not on file   Occupational History    Not on file   Tobacco Use    Smoking status: Current Every Day Smoker     Packs/day: 0.50     Years: 3.00     Pack years: 1.50    Smokeless tobacco: Never Used   Substance and Sexual Activity    Alcohol use: Yes     Alcohol/week: 0.0 standard drinks     Comment: rarely    Drug use: No    Sexual activity: Not on file   Other Topics Concern    Not on file   Social History Narrative    Not on file     Social Determinants of Health     Financial Resource Strain:     Difficulty of Paying Living Expenses:    Food Insecurity:     Worried About Running Out of Food in the Last Year:     [address] in the Last Year:    Transportation Needs:     Lack of Transportation (Medical):  Lack of Transportation (Non-Medical):    Physical Activity:     Days of Exercise per Week:     Minutes of Exercise per Session:    Stress:     Feeling of Stress :    Social Connections:     Frequency of Communication with Friends and Family:     Frequency of Social Gatherings with Friends and Family:     Attends Mandaen Services:     Active Member of Clubs or Organizations:     Attends Club or Organization Meetings:     Marital Status:    Intimate Partner Violence:     Fear of Current or Ex-Partner:     Emotionally Abused:     Physically Abused:     Sexually Abused: ALLERGIES: Pcn [penicillins], Amoxicillin, and Hydrocodone    Review of Systems   Constitutional: Negative. Negative for activity change, appetite change, chills and fever. HENT: Negative. Respiratory: Positive for cough. Negative for shortness of breath. Cardiovascular: Negative. Gastrointestinal: Positive for vomiting. Negative for abdominal pain and diarrhea. Genitourinary: Negative. Musculoskeletal: Negative.   Negative for arthralgias, gait problem and myalgias. Neurological: Negative. Negative for headaches. All other systems reviewed and are negative. Vitals:    07/27/21 0943   BP: 131/77   Pulse: (!) 104   Resp: 18   Temp: 99.4 °F (37.4 °C)   SpO2: 92%   Weight: 113.4 kg (250 lb)   Height: 5' 4\" (1.626 m)            Physical Exam  Vitals and nursing note reviewed. Constitutional:       General: She is not in acute distress. Appearance: Normal appearance. She is not ill-appearing, toxic-appearing or diaphoretic. HENT:      Head: Normocephalic and atraumatic. Eyes:      Conjunctiva/sclera: Conjunctivae normal.   Cardiovascular:      Rate and Rhythm: Normal rate and regular rhythm. Pulses: Normal pulses. Heart sounds: Normal heart sounds. Pulmonary:      Effort: Pulmonary effort is normal. No respiratory distress. Breath sounds: Decreased air movement present. No stridor or transmitted upper airway sounds. Examination of the right-middle field reveals wheezing. Examination of the left-middle field reveals wheezing. Wheezing present. No decreased breath sounds, rhonchi or rales. Chest:      Chest wall: No tenderness. Skin:     General: Skin is warm and dry. Neurological:      General: No focal deficit present. Mental Status: She is alert and oriented to person, place, and time. Mental status is at baseline. MDM  Number of Diagnoses or Management Options  Diagnosis management comments: Patient presents emerge department with symptoms of Covid. We will test her for Covid chest x-ray shows pneumonia. The patient's oxygen dropped to 89% while walking 40 feet.   Most likely patient will be admitted       Amount and/or Complexity of Data Reviewed  Tests in the radiology section of CPT®: ordered and reviewed  Discuss the patient with other providers: yes    Risk of Complications, Morbidity, and/or Mortality  Presenting problems: moderate  Diagnostic procedures: low  Management options: low    Patient Progress  Patient progress: stable         Procedures

## 2021-07-27 NOTE — ED NOTES
TRANSFER - OUT REPORT:    Verbal report given to Jen [address] Street on [employer] Page  being transferred to [address] [place] [address] for routine progression of care       Report consisted of patients Situation, Background, Assessment and   Recommendations(SBAR). Information from the following report(s) SBAR, ED Summary, MAR, Recent Results and Quality Measures was reviewed with the receiving nurse. Lines:   Peripheral IV 07/27/21 Right Antecubital (Active)       Peripheral IV 07/27/21 Left Antecubital (Active)   Site Assessment Clean, dry, & intact 07/27/21 1849   Phlebitis Assessment 0 07/27/21 1849   Infiltration Assessment 0 07/27/21 1849   Action Taken Blood drawn 07/27/21 1849        Opportunity for questions and clarification was provided.       Patient transported with:   O2 @ 2 liters

## 2021-07-28 ENCOUNTER — APPOINTMENT (OUTPATIENT)
Dept: CT IMAGING | Age: 31
DRG: 194 | End: 2021-07-28
Attending: INTERNAL MEDICINE
Payer: COMMERCIAL

## 2021-07-28 PROBLEM — B34.0 INFECTION, ADENOVIRUS: Status: ACTIVE | Noted: 2021-07-28

## 2021-07-28 PROBLEM — B34.8 RHINOVIRUS: Status: ACTIVE | Noted: 2021-07-28

## 2021-07-28 PROBLEM — B34.0 INFECTION, ADENOVIRUS: Status: RESOLVED | Noted: 2021-07-28 | Resolved: 2021-07-28

## 2021-07-28 PROBLEM — J15.9 BACTERIAL PNEUMONIA: Status: ACTIVE | Noted: 2021-07-28

## 2021-07-28 LAB
ANION GAP SERPL CALC-SCNC: 3 MMOL/L (ref 7–16)
BASOPHILS # BLD: 0.1 K/UL (ref 0–0.2)
BASOPHILS NFR BLD: 1 % (ref 0–2)
BUN SERPL-MCNC: 13 MG/DL (ref 6–23)
CALCIUM SERPL-MCNC: 8.2 MG/DL (ref 8.3–10.4)
CHLORIDE SERPL-SCNC: 109 MMOL/L (ref 98–107)
CO2 SERPL-SCNC: 29 MMOL/L (ref 21–32)
CREAT SERPL-MCNC: 0.75 MG/DL (ref 0.6–1)
DIFFERENTIAL METHOD BLD: ABNORMAL
EOSINOPHIL # BLD: 0.5 K/UL (ref 0–0.8)
EOSINOPHIL NFR BLD: 6 % (ref 0.5–7.8)
ERYTHROCYTE [DISTWIDTH] IN BLOOD BY AUTOMATED COUNT: 14.6 % (ref 11.9–14.6)
GLUCOSE SERPL-MCNC: 84 MG/DL (ref 65–100)
HCT VFR BLD AUTO: 38.8 % (ref 35.8–46.3)
HGB BLD-MCNC: 12.1 G/DL (ref 11.7–15.4)
IMM GRANULOCYTES # BLD AUTO: 0.1 K/UL (ref 0–0.5)
IMM GRANULOCYTES NFR BLD AUTO: 1 % (ref 0–5)
LYMPHOCYTES # BLD: 1.9 K/UL (ref 0.5–4.6)
LYMPHOCYTES NFR BLD: 22 % (ref 13–44)
MCH RBC QN AUTO: 26.1 PG (ref 26.1–32.9)
MCHC RBC AUTO-ENTMCNC: 31.2 G/DL (ref 31.4–35)
MCV RBC AUTO: 83.6 FL (ref 79.6–97.8)
MONOCYTES # BLD: 1.1 K/UL (ref 0.1–1.3)
MONOCYTES NFR BLD: 12 % (ref 4–12)
NEUTS SEG # BLD: 5 K/UL (ref 1.7–8.2)
NEUTS SEG NFR BLD: 59 % (ref 43–78)
NRBC # BLD: 0 K/UL (ref 0–0.2)
PLATELET # BLD AUTO: 273 K/UL (ref 150–450)
PMV BLD AUTO: 9.3 FL (ref 9.4–12.3)
POTASSIUM SERPL-SCNC: 4.1 MMOL/L (ref 3.5–5.1)
RBC # BLD AUTO: 4.64 M/UL (ref 4.05–5.2)
SARS-COV-2, COV2: NOT DETECTED
SODIUM SERPL-SCNC: 141 MMOL/L (ref 136–145)
SPECIMEN SOURCE, FCOV2M: NORMAL
WBC # BLD AUTO: 8.6 K/UL (ref 4.3–11.1)

## 2021-07-28 PROCEDURE — 94760 N-INVAS EAR/PLS OXIMETRY 1: CPT

## 2021-07-28 PROCEDURE — 74011000636 HC RX REV CODE- 636: Performed by: INTERNAL MEDICINE

## 2021-07-28 PROCEDURE — 65270000029 HC RM PRIVATE

## 2021-07-28 PROCEDURE — 80048 BASIC METABOLIC PNL TOTAL CA: CPT

## 2021-07-28 PROCEDURE — 74011250636 HC RX REV CODE- 250/636: Performed by: INTERNAL MEDICINE

## 2021-07-28 PROCEDURE — 94640 AIRWAY INHALATION TREATMENT: CPT

## 2021-07-28 PROCEDURE — 77010033678 HC OXYGEN DAILY

## 2021-07-28 PROCEDURE — 74011250637 HC RX REV CODE- 250/637: Performed by: FAMILY MEDICINE

## 2021-07-28 PROCEDURE — 71260 CT THORAX DX C+: CPT

## 2021-07-28 PROCEDURE — 85025 COMPLETE CBC W/AUTO DIFF WBC: CPT

## 2021-07-28 PROCEDURE — 74011000258 HC RX REV CODE- 258: Performed by: INTERNAL MEDICINE

## 2021-07-28 PROCEDURE — 74011250637 HC RX REV CODE- 250/637: Performed by: INTERNAL MEDICINE

## 2021-07-28 PROCEDURE — 36415 COLL VENOUS BLD VENIPUNCTURE: CPT

## 2021-07-28 PROCEDURE — 74011000250 HC RX REV CODE- 250: Performed by: INTERNAL MEDICINE

## 2021-07-28 RX ORDER — IBUPROFEN 200 MG
1 TABLET ORAL EVERY 24 HOURS
Status: DISCONTINUED | OUTPATIENT
Start: 2021-07-28 | End: 2021-07-29 | Stop reason: HOSPADM

## 2021-07-28 RX ORDER — DEXAMETHASONE 4 MG/1
4 TABLET ORAL DAILY
Status: DISCONTINUED | OUTPATIENT
Start: 2021-07-29 | End: 2021-07-29 | Stop reason: HOSPADM

## 2021-07-28 RX ORDER — SODIUM CHLORIDE 0.9 % (FLUSH) 0.9 %
10 SYRINGE (ML) INJECTION
Status: COMPLETED | OUTPATIENT
Start: 2021-07-28 | End: 2021-07-28

## 2021-07-28 RX ORDER — ZOLPIDEM TARTRATE 5 MG/1
5 TABLET ORAL ONCE
Status: COMPLETED | OUTPATIENT
Start: 2021-07-28 | End: 2021-07-28

## 2021-07-28 RX ORDER — IPRATROPIUM BROMIDE AND ALBUTEROL SULFATE 2.5; .5 MG/3ML; MG/3ML
3 SOLUTION RESPIRATORY (INHALATION)
Status: DISCONTINUED | OUTPATIENT
Start: 2021-07-28 | End: 2021-07-29

## 2021-07-28 RX ADMIN — Medication 10 ML: at 21:18

## 2021-07-28 RX ADMIN — ZOLPIDEM TARTRATE 5 MG: 5 TABLET ORAL at 21:18

## 2021-07-28 RX ADMIN — DOXYCYCLINE HYCLATE 100 MG: 100 CAPSULE ORAL at 09:50

## 2021-07-28 RX ADMIN — FLUTICASONE PROPIONATE 2 SPRAY: 50 SPRAY, METERED NASAL at 09:50

## 2021-07-28 RX ADMIN — IOPAMIDOL 100 ML: 755 INJECTION, SOLUTION INTRAVENOUS at 10:19

## 2021-07-28 RX ADMIN — ENOXAPARIN SODIUM 40 MG: 40 INJECTION SUBCUTANEOUS at 05:30

## 2021-07-28 RX ADMIN — ENOXAPARIN SODIUM 40 MG: 40 INJECTION SUBCUTANEOUS at 17:59

## 2021-07-28 RX ADMIN — Medication 10 ML: at 18:01

## 2021-07-28 RX ADMIN — ESCITALOPRAM OXALATE 20 MG: 10 TABLET ORAL at 09:50

## 2021-07-28 RX ADMIN — IPRATROPIUM BROMIDE AND ALBUTEROL SULFATE 3 ML: .5; 3 SOLUTION RESPIRATORY (INHALATION) at 19:29

## 2021-07-28 RX ADMIN — IPRATROPIUM BROMIDE AND ALBUTEROL SULFATE 3 ML: .5; 3 SOLUTION RESPIRATORY (INHALATION) at 14:36

## 2021-07-28 RX ADMIN — CEFTRIAXONE SODIUM 2 G: 2 INJECTION, POWDER, FOR SOLUTION INTRAMUSCULAR; INTRAVENOUS at 16:03

## 2021-07-28 RX ADMIN — ACETAMINOPHEN 650 MG: 325 TABLET, FILM COATED ORAL at 15:36

## 2021-07-28 RX ADMIN — ACETAMINOPHEN 650 MG: 325 TABLET, FILM COATED ORAL at 06:41

## 2021-07-28 RX ADMIN — IPRATROPIUM BROMIDE AND ALBUTEROL SULFATE 3 ML: .5; 3 SOLUTION RESPIRATORY (INHALATION) at 09:20

## 2021-07-28 RX ADMIN — FAMOTIDINE 20 MG: 20 TABLET ORAL at 09:50

## 2021-07-28 RX ADMIN — DOXYCYCLINE HYCLATE 100 MG: 100 CAPSULE ORAL at 21:18

## 2021-07-28 RX ADMIN — ACETAMINOPHEN 650 MG: 325 TABLET, FILM COATED ORAL at 21:25

## 2021-07-28 RX ADMIN — Medication 10 ML: at 10:20

## 2021-07-28 RX ADMIN — FAMOTIDINE 20 MG: 20 TABLET ORAL at 17:59

## 2021-07-28 RX ADMIN — Medication 10 ML: at 05:17

## 2021-07-28 RX ADMIN — SODIUM CHLORIDE 100 ML: 900 INJECTION, SOLUTION INTRAVENOUS at 10:19

## 2021-07-28 NOTE — PROGRESS NOTES
Shift assessment completed. No signs of acute distress. Pain assessed. Patient stated they have tenderness in abdomen and lower back. Lung sounds diminished. Will continue to monitor.

## 2021-07-28 NOTE — PROGRESS NOTES
Problem: Falls - Risk of  Goal: *Absence of Falls  Description: Document Chalino Reddy Fall Risk and appropriate interventions in the flowsheet.   Outcome: Progressing Towards Goal  Note: Fall Risk Interventions:                                Problem: Patient Education: Go to Patient Education Activity  Goal: Patient/Family Education  Outcome: Progressing Towards Goal     Problem: Pain  Goal: *Control of Pain  Outcome: Progressing Towards Goal

## 2021-07-28 NOTE — PROGRESS NOTES
Problem: Falls - Risk of  Goal: *Absence of Falls  Description: Document [place] Fall Risk and appropriate interventions in the flowsheet.   Outcome: Progressing Towards Goal  Note: Fall Risk Interventions:                                Problem: Patient Education: Go to Patient Education Activity  Goal: Patient/Family Education  Outcome: Progressing Towards Goal     Problem: Pain  Goal: *Control of Pain  Outcome: Progressing Towards Goal

## 2021-07-28 NOTE — PROGRESS NOTES
Care Management Interventions  PCP Verified by CM: Yes (Dr. Osiris Call @ [facility] 211)  Mode of Transport at Discharge:  (family)  Transition of Care Consult (CM Consult): Discharge Planning  Current Support Network: Lives with Spouse (wife/Mena Gomez #[phone])  Confirm Follow Up Transport: Family  Discharge Location  Discharge Placement: Home    Saw pt in interdisciplinarily rounds with the following disciplines: Physician, Case Management, Nursing, Dietary,Therapy and Pharmacy. The plan of care was discussed along with goals for discharge date/ location. Per  There are no further d/c plans at this time. Pt is coughing up blood and scheduled to have a CT of the chest done.  SW will follow-up w/ pt after test results    FABIANO Denson

## 2021-07-28 NOTE — PROGRESS NOTES
Physician Progress Note      PATIENT:               Lei Lea  CSN #:                  [id number]  :                       1990  ADMIT DATE:       2021 9:49 AM  DISCH DATE:  RESPONDING  PROVIDER #:        GISSEL VILLELA DO          QUERY TEXT:    Patient admitted with CAP. Noted documentation of acute respiratory distress in H&P. In order to support the diagnosis of acute respiratory distress, please include additional clinical indicators in your documentation. Or please document if the diagnosis of acute respiratory distress has been ruled out after further study. The medical record reflects the following:  Risk Factors: \"RUL infiltrate so suspect underlying CAP\"  Clinical Indicators: \"No overt distress\"; \"Appears even, unlabored\"; \"ED workup notable for resting o2 sat of 89% on RA\"  Treatment: Supplemental O2 maxed at 2L NC    Acute Respiratory Failure Clinical Indicators per [employer] Training Guide and Quick Reference Guide:  Supplemental oxygen of 40% or more  Presence of respiratory distress, tachypnea, dyspnea, shortness of breath, wheezing  pO2 < 60 mmHg  pCO2 > 50 and pH < 7.35  P/F ratio (pO2 / FIO2) < 300  pO2 decrease or pCO2 increase by 10 mmHg from baseline (if known)  Unable to speak in complete sentences  Use of accessory muscles to breathe  Extreme anxiety and feeling of impending doom  Tripod position  Confusion/altered mental status/obtunded  Options provided:  -- Acute Respiratory Distress as evidenced by, Please document evidence. -- Acute Respiratory Distress ruled out after study  -- Other - I will add my own diagnosis  -- Disagree - Not applicable / Not valid  -- Disagree - Clinically unable to determine / Unknown  -- Refer to Clinical Documentation Reviewer    PROVIDER RESPONSE TEXT:    Acute Respiratory Distress has been ruled out after study.     Query created by: Otoniel Allison on 2021 9:39 AM      Electronically signed by:  Denia Wood DO 2021 12:03 PM

## 2021-07-28 NOTE — H&P
[facility] Hospitalist History and Physical   Admit Date:  2021  9:49 AM   Name:  Ozzie Motley   Age:  31 y.o. Sex:  female  :  1990   MRN:  [id number]     Presenting Complaint: Vomiting and Cough    Reason(s) for Admission: CAP (community acquired pneumonia) [J18.9]     Assessment & Plan:     # Acute hypoxic respiratory distress  - Resp viral panel Pos for rhinovirus  - also with RUL infiltrate so suspect underlying CAP  - treat with Rocephin/doxycline  - PRN albuterol  - follow blood/sputum cultures  - wean oxygen as tolerated    # GERD  - H2 blocker    # BMI 42    # depression  - cont lexapor    # GAURANG  - wears CPAP at home, cont IP        Disposition/Discharge Planning: home in 1-2 days pending correction of hypoxia    Diet: ADULT DIET Regular  VTE ppx: lovenox  Code status: Full Code    HPI:   Ozzie Motley is a 31 y.o. female with medical history of  MO, anxiety, tob abuse, allergic rhinitis who presented to ED with report of non productive cough, dyspnea, wheezing for previous 3-4 days. Has not had covid vaccine as she reports bad reaction to vaccines in the past. She denies sick contacts. No high fevers recorded, subjective chills. She admits to compliance with CPAP and says she \"missed one night and woke up the next morning like this\"    ED workup notable for resting o2 sat of 89% on RA    CXR notable for RUL opacity. Review of Systems:  10 systems reviewed and negative except as noted in HPI. Past Medical History:   Diagnosis Date    Cancer [facility]) 2014    thyroid    Obesity (BMI 30-39. 9)     Pilonidal cyst       Past Surgical History:   Procedure Laterality Date    HX APPENDECTOMY  age 2011?     HX HEENT  age 5    tubes in ears    HX HEENT  age15    wisdom teeth ext    HX OTHER SURGICAL  2016    pilonidal cyst removed      Allergies   Allergen Reactions    Pcn [Penicillins] Rash    Amoxicillin Hives     Pt states she is no longer allergic    Hydrocodone Nausea and Vomiting      Social History     Tobacco Use    Smoking status: Current Every Day Smoker     Packs/day: 0.50     Years: 3.00     Pack years: 1.50    Smokeless tobacco: Never Used   Substance Use Topics    Alcohol use: Yes     Alcohol/week: 0.0 standard drinks     Comment: rarely      Family History   Problem Relation Age of Onset    Diabetes Mother     Hypertension Mother     Lung Disease Mother     Hypertension Father     Cancer Maternal Grandmother         stomach    Cancer Paternal Grandfather         colon      Family history reviewed and noncontributory to patient's acute condition; no relevant family history unless otherwise noted above. Immunization History   Administered Date(s) Administered    Tdap 10/14/2017     PTA Medications:  Current Outpatient Medications   Medication Instructions    CHROM TENISHA/BRINDAL BERRY (GARCINIA CAMBOGIA PO) Take  by mouth.  cpap machine kit Does Not Apply    escitalopram oxalate (LEXAPRO) 10 mg tablet 1 every day for anxiety control    famotidine (PEPCID PO) Oral    fluticasone (FLONASE) 50 mcg/actuation nasal spray 1 spray IEN bid    hydrocortisone-pramoxine (ANALPRAM-HC) 2.5-1 % (4g) cream Apply bid to tid prn     Use closest generic option.   Disp large tube    multivitamin (ONE A DAY) tablet 1 Tablet, Oral, DAILY    omeprazole (PRILOSEC) 20 mg capsule 1 every day for acid reflux/ulcers       Objective:     Patient Vitals for the past 24 hrs:   Temp Pulse Resp BP SpO2   07/27/21 2309 98.7 °F (37.1 °C) 92 18 116/65 92 %   07/27/21 2000     96 %   07/27/21 1929 99.7 °F (37.6 °C) 87 20 133/60 92 %   07/27/21 1849  85      07/27/21 1843     95 %   07/27/21 1820     95 %   07/27/21 1521     95 %   07/27/21 1309     96 %   07/27/21 1156     95 %   07/27/21 1150     94 %   07/27/21 1054     (!) 89 %   07/27/21 0943 99.4 °F (37.4 °C) (!) 104 18 131/77 92 %     Oxygen Therapy  O2 Sat (%): 92 % (07/27/21 2309)  Pulse via Oximetry: 80 beats per minute (07/27/21 2000)  O2 Device: Nasal cannula (07/27/21 2000)  O2 Flow Rate (L/min): 2 l/min (07/27/21 2000)    Estimated body mass index is 42.91 kg/m² as calculated from the following:    Height as of this encounter: 5' 4\" (1.626 m). Weight as of this encounter: 113.4 kg (250 lb). No intake or output data in the 24 hours ending 07/28/21 0016      Physical Exam:  General:    Well nourished. No overt distress  Eyes:  Sclerae appear normal.  Extraocular movements intact. HENT:  Normocephalic, atraumatic. Moist mucous membranes  Neck:  Supple. No restricted ROM. CV:   RRR. No m/r/g. No peripheral edema. No JVD  Lungs:   CTAB. No wheezing, rhonchi, or rales. Appears even, unlabored  Abdomen: Bowel sounds present. Soft, nontender, nondistended. Extremities: Warm and dry. No cyanosis or clubbing  Skin:     No rashes. Normal turgor. Normal coloration  Neuro:  Cranial nerves II-XII grossly intact. No focal weakness or numbness  Psych:  Normal mood and affect. Calm    I reviewed the labs, imaging, EKGs, telemetry, meds given in ER, and other studies done:    Recent Results (from the past 24 hour(s))   SARS-COV-2    Collection Time: 07/27/21 11:17 AM   Result Value Ref Range    SARS-CoV-2 Please find results under separate order     CBC WITH AUTOMATED DIFF    Collection Time: 07/27/21 11:17 AM   Result Value Ref Range    WBC 12.9 (H) 4.3 - 11.1 K/uL    RBC 5.07 4.05 - 5.2 M/uL    HGB 13.5 11.7 - 15.4 g/dL    HCT 41.1 35.8 - 46.3 %    MCV 81.1 79.6 - 97.8 FL    MCH 26.6 26.1 - 32.9 PG    MCHC 32.8 31.4 - 35.0 g/dL    RDW 14.6 11.9 - 14.6 %    PLATELET 127 397 - 720 K/uL    MPV 9.1 (L) 9.4 - 12.3 FL    ABSOLUTE NRBC 0.00 0.0 - 0.2 K/uL    DF AUTOMATED      NEUTROPHILS 72 43 - 78 %    LYMPHOCYTES 15 13 - 44 %    MONOCYTES 9 4.0 - 12.0 %    EOSINOPHILS 3 0.5 - 7.8 %    BASOPHILS 1 0.0 - 2.0 %    IMMATURE GRANULOCYTES 1 0.0 - 5.0 %    ABS.  NEUTROPHILS 9.3 (H) 1.7 - 8.2 K/UL ABS. LYMPHOCYTES 2.0 0.5 - 4.6 K/UL    ABS. MONOCYTES 1.1 0.1 - 1.3 K/UL    ABS. EOSINOPHILS 0.4 0.0 - 0.8 K/UL    ABS. BASOPHILS 0.1 0.0 - 0.2 K/UL    ABS. IMM. GRANS. 0.1 0.0 - 0.5 K/UL   METABOLIC PANEL, COMPREHENSIVE    Collection Time: 07/27/21 11:17 AM   Result Value Ref Range    Sodium 140 136 - 145 mmol/L    Potassium 3.8 3.5 - 5.1 mmol/L    Chloride 108 (H) 98 - 107 mmol/L    CO2 26 21 - 32 mmol/L    Anion gap 6 (L) 7 - 16 mmol/L    Glucose 85 65 - 100 mg/dL    BUN 13 6 - 23 MG/DL    Creatinine 0.76 0.6 - 1.0 MG/DL    GFR est AA >60 >60 ml/min/1.73m2    GFR est non-AA >60 >60 ml/min/1.73m2    Calcium 8.6 8.3 - 10.4 MG/DL    Bilirubin, total 0.5 0.2 - 1.1 MG/DL    ALT (SGPT) 27 12 - 65 U/L    AST (SGOT) 11 (L) 15 - 37 U/L    Alk.  phosphatase 69 50 - 130 U/L    Protein, total 7.6 6.3 - 8.2 g/dL    Albumin 3.7 3.5 - 5.0 g/dL    Globulin 3.9 (H) 2.3 - 3.5 g/dL    A-G Ratio 0.9 (L) 1.2 - 3.5     LIPASE    Collection Time: 07/27/21 11:17 AM   Result Value Ref Range    Lipase 30 (L) 73 - 393 U/L   COVID-19 RAPID TEST    Collection Time: 07/27/21 11:17 AM   Result Value Ref Range    Specimen source Nasopharyngeal      COVID-19 rapid test Not detected NOTD     RESPIRATORY VIRUS PANEL W/COVID-19, PCR    Collection Time: 07/27/21  1:51 PM    Specimen: Nasopharyngeal   Result Value Ref Range    Adenovirus NOT DETECTED NOTDET      Coronavirus 229E NOT DETECTED NOTDET      Coronavirus HKU1 NOT DETECTED NOTDET      Coronavirus CVNL63 NOT DETECTED NOTDET      Coronavirus OC43 NOT DETECTED NOTDET      SARS-CoV-2, PCR NOT DETECTED NOTDET      Metapneumovirus NOT DETECTED NOTDET      Rhinovirus and Enterovirus Detected (A) NOTDET      Influenza A NOT DETECTED NOTDET      Influenza B NOT DETECTED NOTDET      Parainfluenza 1 NOT DETECTED NOTDET      Parainfluenza 2 NOT DETECTED NOTDET      Parainfluenza 3 NOT DETECTED NOTDET      Parainfluenza virus 4 NOT DETECTED NOTDET      RSV by PCR NOT DETECTED NOTDET      B. parapertussis, PCR NOT DETECTED NOTDET      Bordetella pertussis - PCR NOT DETECTED NOTDET      Chlamydophila pneumoniae DNA, QL, PCR NOT DETECTED NOTDET      Mycoplasma pneumoniae DNA, QL, PCR NOT DETECTED NOTDET         All Micro Results     Procedure Component Value Units Date/Time    CULTURE, RESPIRATORY/SPUTUM/BRONCH Nasra Weeks [[id number]] Collected: 07/27/21 1828    Order Status: Completed Specimen: Sputum Updated: 07/27/21 2114    CULTURE, BLOOD [[id number]] Collected: 07/27/21 2033    Order Status: Completed Specimen: Blood Updated: 07/27/21 2049    CULTURE, BLOOD [[id number]] Collected: 07/27/21 1753    Order Status: Completed Specimen: Blood Updated: 07/27/21 1822    RESPIRATORY VIRUS PANEL W/COVID-19, PCR [[id number]]  (Abnormal) Collected: 07/27/21 1351    Order Status: Completed Specimen: Nasopharyngeal Updated: 07/27/21 1706     Adenovirus NOT DETECTED        Coronavirus 229E NOT DETECTED        Coronavirus HKU1 NOT DETECTED        Coronavirus CVNL63 NOT DETECTED        Coronavirus OC43 NOT DETECTED        SARS-CoV-2, PCR NOT DETECTED        Metapneumovirus NOT DETECTED        Rhinovirus and Enterovirus Detected        Influenza A NOT DETECTED        Influenza B NOT DETECTED        Parainfluenza 1 NOT DETECTED        Parainfluenza 2 NOT DETECTED        Parainfluenza 3 NOT DETECTED        Parainfluenza virus 4 NOT DETECTED        RSV by PCR NOT DETECTED        B. parapertussis, PCR NOT DETECTED        Bordetella pertussis - PCR NOT DETECTED        Chlamydophila pneumoniae DNA, QL, PCR NOT DETECTED        Mycoplasma pneumoniae DNA, QL, PCR NOT DETECTED       SARS-COV-2, PCR [[id number]] Collected: 07/27/21 1117    Order Status: Completed Updated: 07/27/21 1155    COVID-19 RAPID TEST [[id number]] Collected: 07/27/21 1117    Order Status: Completed Specimen: Nasopharyngeal Updated: 07/27/21 1154     Specimen source Nasopharyngeal        COVID-19 rapid test Not detected        Comment:      The specimen is NEGATIVE for SARS-CoV-2, the novel coronavirus associated with COVID-19. A negative result does not rule out COVID-19. This test has been authorized by the FDA under an Emergency Use Authorization (EUA) for use by authorized laboratories. Fact sheet for Healthcare Providers: ConventionUpdate.co.nz  Fact sheet for Patients: ConventionUpdate[employer]co.nz       Methodology: Isothermal Nucleic Acid Amplification         COVID-19 RAPID TEST [[id number]] Collected: 07/27/21 1117    Order Status: Canceled           Other Studies:    XR CHEST PA LAT    Result Date: 7/27/2021  EXAMINATION: XR CHEST PA LAT 7/27/2021 10:10 AM INDICATION: Vomiting and body aches cough and head pressure for 2 days COMPARISON: None available TECHNIQUE: PA and lateral views of the chest were obtained. FINDINGS: Osseous: No acute abnormality. Cardiomediastinal Silhouette: Normal in size. Lungs: There is a moderate somewhat flat opacity in the right upper lobe. There may be elevation of the minor fissure. Pleura: There is trace right pleural fluid. No pneumothorax. Upper Abdomen: No abnormality. Moderate somewhat linear opacity in the right upper lobe which may be atelectasis from mucous plugging or pneumonia. Trace adjacent right pleural fluid.        Medications Administered      Medications Administered     acetaminophen (TYLENOL) tablet 650 mg     Admin Date  07/27/2021 Action  Given Dose  650 mg Route  Oral Administered By  Angela Parson RN          cefTRIAXone (ROCEPHIN) 2 g in 0.9% sodium chloride (MBP/ADV) 50 mL MBP     Admin Date  07/27/2021 Action  New Bag Dose  2 g Rate  100 mL/hr Route  IntraVENous Administered By  Angela Parson RN          doxycycline (VIBRAMYCIN) capsule 100 mg     Admin Date  07/27/2021 Action  Given Dose  100 mg Route  Oral Administered By  Reggie Salguero I          enoxaparin (LOVENOX) injection 40 mg     Admin Date  07/27/2021 Action  Given Dose  40 mg Route  SubCUTAneous Administered By  Stephen Masters RN          famotidine (PEPCID) tablet 20 mg     Admin Date  07/27/2021 Action  Given Dose  20 mg Route  Oral Administered By  Sara Hummel I          ondansetron [facility]) injection 4 mg     Admin Date  07/27/2021 Action  Given Dose  4 mg Route  IntraVENous Administered By  Stephen Masters RN           Admin Date  07/27/2021 Action  Given Dose  4 mg Route  IntraVENous Administered By  Stephen Masters RN          sodium chloride (NS) flush 5-40 mL     Admin Date  07/27/2021 Action  Given Dose  10 mL Route  IntraVENous Administered By  Sara Hummel I          sodium chloride 0.9 % bolus infusion 1,000 mL     Admin Date  07/27/2021 Action  New Bag Dose  1,000 mL Route  IntraVENous Administered By  Stephen Masters RN                  Problem List:     Hospital Problems as of 7/28/2021 Date Reviewed: 5/5/2017        Codes Class Noted - Resolved POA    CAP (community acquired pneumonia) ICD-10-CM: J18.9  ICD-9-CM: 486  7/27/2021 - Present Unknown                 Signed:  Sarmad Mcneal DO

## 2021-07-28 NOTE — H&P
[employer] Hospitalist History and Physical   Admit Date:  2021  9:49 AM   Name:  Swetha Motley   Age:  31 y.o. Sex:  female  :  1990   MRN:  [id number]     Presenting Complaint: Vomiting and Cough    Reason(s) for Admission: CAP (community acquired pneumonia) [J18.9]     Assessment & Plan:     # Acute hypoxic respiratory distress  - Resp viral panel Pos for rhinovirus  - also with RUL infiltrate so suspect underlying CAP  - treat with Rocephin/doxycline  - PRN albuterol  - follow blood/sputum cultures  - wean oxygen as tolerated    # GERD  - H2 blocker    # BMI 42    # depression  - cont lexapor    # GAURANG  - wears CPAP at home, cont IP        Disposition/Discharge Planning: home in 1-2 days pending correction of hypoxia    Diet: ADULT DIET Regular  VTE ppx: lovenox  Code status: Full Code    HPI:   Swetha Motley is a 31 y.o. female with medical history of  MO, anxiety, tob abuse, allergic rhinitis who presented to ED with report of non productive cough, dyspnea, wheezing for previous 3-4 days. Has not had covid vaccine as she reports bad reaction to vaccines in the past. She denies sick contacts. No high fevers recorded, subjective chills. She admits to compliance with CPAP and says she \"missed one night and woke up the next morning like this\"    ED workup notable for resting o2 sat of 89% on RA    CXR notable for RUL opacity. Review of Systems:  10 systems reviewed and negative except as noted in HPI. Past Medical History:   Diagnosis Date    Cancer [facility])     thyroid    Obesity (BMI 30-39. 9)     Pilonidal cyst       Past Surgical History:   Procedure Laterality Date    HX APPENDECTOMY  age 2011?     HX HEENT  age 5    tubes in ears    HX HEENT  age17    wisdom teeth ext    HX OTHER SURGICAL  2016    pilonidal cyst removed      Allergies   Allergen Reactions    Pcn [Penicillins] Rash    Amoxicillin Hives     Pt states she is no longer allergic    Hydrocodone Nausea and Vomiting      Social History     Tobacco Use    Smoking status: Current Every Day Smoker     Packs/day: 0.50     Years: 3.00     Pack years: 1.50    Smokeless tobacco: Never Used   Substance Use Topics    Alcohol use: Yes     Alcohol/week: 0.0 standard drinks     Comment: rarely      Family History   Problem Relation Age of Onset    Diabetes Mother     Hypertension Mother     Lung Disease Mother     Hypertension Father     Cancer Maternal Grandmother         stomach    Cancer Paternal Grandfather         colon      Family history reviewed and noncontributory to patient's acute condition; no relevant family history unless otherwise noted above. Immunization History   Administered Date(s) Administered    Tdap 10/14/2017     PTA Medications:  Current Outpatient Medications   Medication Instructions    CHROM TENISHA/BRINDAL BERRY (GARCINIA CAMBOGIA PO) Take  by mouth.  cpap machine kit Does Not Apply    escitalopram oxalate (LEXAPRO) 10 mg tablet 1 every day for anxiety control    famotidine (PEPCID PO) Oral    fluticasone (FLONASE) 50 mcg/actuation nasal spray 1 spray IEN bid    hydrocortisone-pramoxine (ANALPRAM-HC) 2.5-1 % (4g) cream Apply bid to tid prn     Use closest generic option.   Disp large tube    multivitamin (ONE A DAY) tablet 1 Tablet, Oral, DAILY    omeprazole (PRILOSEC) 20 mg capsule 1 every day for acid reflux/ulcers       Objective:     Patient Vitals for the past 24 hrs:   Temp Pulse Resp BP SpO2   07/27/21 2309 98.7 °F (37.1 °C) 92 18 116/65 92 %   07/27/21 2000 -- -- -- -- 96 %   07/27/21 1929 99.7 °F (37.6 °C) 87 20 133/60 92 %   07/27/21 1849 -- 85 -- -- --   07/27/21 1843 -- -- -- -- 95 %   07/27/21 1820 -- -- -- -- 95 %   07/27/21 1521 -- -- -- -- 95 %   07/27/21 1309 -- -- -- -- 96 %   07/27/21 1156 -- -- -- -- 95 %   07/27/21 1150 -- -- -- -- 94 %   07/27/21 1054 -- -- -- -- (!) 89 %   07/27/21 0943 99.4 °F (37.4 °C) (!) 104 18 131/77 92 %     Oxygen Therapy  O2 Sat (%): 92 % (07/27/21 2309)  Pulse via Oximetry: 80 beats per minute (07/27/21 2000)  O2 Device: Nasal cannula (07/27/21 2000)  O2 Flow Rate (L/min): 2 l/min (07/27/21 2000)    Estimated body mass index is 42.91 kg/m² as calculated from the following:    Height as of this encounter: 5' 4\" (1.626 m). Weight as of this encounter: 113.4 kg (250 lb). No intake or output data in the 24 hours ending 07/28/21 0016      Physical Exam:  General:    Well nourished. No overt distress  Eyes:  Sclerae appear normal.  Extraocular movements intact. HENT:  Normocephalic, atraumatic. Moist mucous membranes  Neck:  Supple. No restricted ROM. CV:   RRR. No m/r/g. No peripheral edema. No JVD  Lungs:   CTAB. No wheezing, rhonchi, or rales. Appears even, unlabored  Abdomen: Bowel sounds present. Soft, nontender, nondistended. Extremities: Warm and dry. No cyanosis or clubbing  Skin:     No rashes. Normal turgor. Normal coloration  Neuro:  Cranial nerves II-XII grossly intact. No focal weakness or numbness  Psych:  Normal mood and affect. Calm    I reviewed the labs, imaging, EKGs, telemetry, meds given in ER, and other studies done:    Recent Results (from the past 24 hour(s))   SARS-COV-2    Collection Time: 07/27/21 11:17 AM   Result Value Ref Range    SARS-CoV-2 Please find results under separate order     CBC WITH AUTOMATED DIFF    Collection Time: 07/27/21 11:17 AM   Result Value Ref Range    WBC 12.9 (H) 4.3 - 11.1 K/uL    RBC 5.07 4.05 - 5.2 M/uL    HGB 13.5 11.7 - 15.4 g/dL    HCT 41.1 35.8 - 46.3 %    MCV 81.1 79.6 - 97.8 FL    MCH 26.6 26.1 - 32.9 PG    MCHC 32.8 31.4 - 35.0 g/dL    RDW 14.6 11.9 - 14.6 %    PLATELET 968 250 - 650 K/uL    MPV 9.1 (L) 9.4 - 12.3 FL    ABSOLUTE NRBC 0.00 0.0 - 0.2 K/uL    DF AUTOMATED      NEUTROPHILS 72 43 - 78 %    LYMPHOCYTES 15 13 - 44 %    MONOCYTES 9 4.0 - 12.0 %    EOSINOPHILS 3 0.5 - 7.8 %    BASOPHILS 1 0.0 - 2.0 %    IMMATURE GRANULOCYTES 1 0.0 - 5.0 %    ABS. NEUTROPHILS 9.3 (H) 1.7 - 8.2 K/UL    ABS. LYMPHOCYTES 2.0 0.5 - 4.6 K/UL    ABS. MONOCYTES 1.1 0.1 - 1.3 K/UL    ABS. EOSINOPHILS 0.4 0.0 - 0.8 K/UL    ABS. BASOPHILS 0.1 0.0 - 0.2 K/UL    ABS. IMM. GRANS. 0.1 0.0 - 0.5 K/UL   METABOLIC PANEL, COMPREHENSIVE    Collection Time: 07/27/21 11:17 AM   Result Value Ref Range    Sodium 140 136 - 145 mmol/L    Potassium 3.8 3.5 - 5.1 mmol/L    Chloride 108 (H) 98 - 107 mmol/L    CO2 26 21 - 32 mmol/L    Anion gap 6 (L) 7 - 16 mmol/L    Glucose 85 65 - 100 mg/dL    BUN 13 6 - 23 MG/DL    Creatinine 0.76 0.6 - 1.0 MG/DL    GFR est AA >60 >60 ml/min/1.73m2    GFR est non-AA >60 >60 ml/min/1.73m2    Calcium 8.6 8.3 - 10.4 MG/DL    Bilirubin, total 0.5 0.2 - 1.1 MG/DL    ALT (SGPT) 27 12 - 65 U/L    AST (SGOT) 11 (L) 15 - 37 U/L    Alk.  phosphatase 69 50 - 130 U/L    Protein, total 7.6 6.3 - 8.2 g/dL    Albumin 3.7 3.5 - 5.0 g/dL    Globulin 3.9 (H) 2.3 - 3.5 g/dL    A-G Ratio 0.9 (L) 1.2 - 3.5     LIPASE    Collection Time: 07/27/21 11:17 AM   Result Value Ref Range    Lipase 30 (L) 73 - 393 U/L   COVID-19 RAPID TEST    Collection Time: 07/27/21 11:17 AM   Result Value Ref Range    Specimen source Nasopharyngeal      COVID-19 rapid test Not detected NOTD     RESPIRATORY VIRUS PANEL W/COVID-19, PCR    Collection Time: 07/27/21  1:51 PM    Specimen: Nasopharyngeal   Result Value Ref Range    Adenovirus NOT DETECTED NOTDET      Coronavirus 229E NOT DETECTED NOTDET      Coronavirus HKU1 NOT DETECTED NOTDET      Coronavirus CVNL63 NOT DETECTED NOTDET      Coronavirus OC43 NOT DETECTED NOTDET      SARS-CoV-2, PCR NOT DETECTED NOTDET      Metapneumovirus NOT DETECTED NOTDET      Rhinovirus and Enterovirus Detected (A) NOTDET      Influenza A NOT DETECTED NOTDET      Influenza B NOT DETECTED NOTDET      Parainfluenza 1 NOT DETECTED NOTDET      Parainfluenza 2 NOT DETECTED NOTDET      Parainfluenza 3 NOT DETECTED NOTDET      Parainfluenza virus 4 NOT DETECTED NOTDET      RSV by PCR NOT DETECTED NOTDET      B. parapertussis, PCR NOT DETECTED NOTDET      Bordetella pertussis - PCR NOT DETECTED NOTDET      Chlamydophila pneumoniae DNA, QL, PCR NOT DETECTED NOTDET      Mycoplasma pneumoniae DNA, QL, PCR NOT DETECTED NOTDET         All Micro Results     Procedure Component Value Units Date/Time    CULTURE, RESPIRATORY/SPUTUM/BRONCH Arlyss Antu [[id number]] Collected: 07/27/21 1828    Order Status: Completed Specimen: Sputum Updated: 07/27/21 2114    CULTURE, BLOOD [[id number]] Collected: 07/27/21 2033    Order Status: Completed Specimen: Blood Updated: 07/27/21 2049    CULTURE, BLOOD [[id number]] Collected: 07/27/21 1753    Order Status: Completed Specimen: Blood Updated: 07/27/21 1822    RESPIRATORY VIRUS PANEL W/COVID-19, PCR [[id number]]  (Abnormal) Collected: 07/27/21 1351    Order Status: Completed Specimen: Nasopharyngeal Updated: 07/27/21 1706     Adenovirus NOT DETECTED        Coronavirus 229E NOT DETECTED        Coronavirus HKU1 NOT DETECTED        Coronavirus CVNL63 NOT DETECTED        Coronavirus OC43 NOT DETECTED        SARS-CoV-2, PCR NOT DETECTED        Metapneumovirus NOT DETECTED        Rhinovirus and Enterovirus Detected        Influenza A NOT DETECTED        Influenza B NOT DETECTED        Parainfluenza 1 NOT DETECTED        Parainfluenza 2 NOT DETECTED        Parainfluenza 3 NOT DETECTED        Parainfluenza virus 4 NOT DETECTED        RSV by PCR NOT DETECTED        B. parapertussis, PCR NOT DETECTED        Bordetella pertussis - PCR NOT DETECTED        Chlamydophila pneumoniae DNA, QL, PCR NOT DETECTED        Mycoplasma pneumoniae DNA, QL, PCR NOT DETECTED       SARS-COV-2, PCR [[id number]] Collected: 07/27/21 1117    Order Status: Completed Updated: 07/27/21 1155    COVID-19 RAPID TEST [[id number]] Collected: 07/27/21 1117    Order Status: Completed Specimen: Nasopharyngeal Updated: 07/27/21 1154     Specimen source Nasopharyngeal        COVID-19 rapid test Not detected        Comment: The specimen is NEGATIVE for SARS-CoV-2, the novel coronavirus associated with COVID-19. A negative result does not rule out COVID-19. This test has been authorized by the FDA under an Emergency Use Authorization (EUA) for use by authorized laboratories. Fact sheet for Healthcare Providers: Convention"[employer]date.co.nz  Fact sheet for Patients: Convention"[employer]date.co.nz       Methodology: Isothermal Nucleic Acid Amplification         COVID-19 RAPID TEST [[id number]] Collected: 07/27/21 1117    Order Status: Canceled           Other Studies:    XR CHEST PA LAT    Result Date: 7/27/2021  EXAMINATION: XR CHEST PA LAT 7/27/2021 10:10 AM INDICATION: Vomiting and body aches cough and head pressure for 2 days COMPARISON: None available TECHNIQUE: PA and lateral views of the chest were obtained. FINDINGS: Osseous: No acute abnormality. Cardiomediastinal Silhouette: Normal in size. Lungs: There is a moderate somewhat flat opacity in the right upper lobe. There may be elevation of the minor fissure. Pleura: There is trace right pleural fluid. No pneumothorax. Upper Abdomen: No abnormality. Moderate somewhat linear opacity in the right upper lobe which may be atelectasis from mucous plugging or pneumonia. Trace adjacent right pleural fluid.        Medications Administered      Medications Administered     acetaminophen (TYLENOL) tablet 650 mg     Admin Date  07/27/2021 Action  Given Dose  650 mg Route  Oral Administered By  Neema Justice RN          cefTRIAXone (ROCEPHIN) 2 g in 0.9% sodium chloride (MBP/ADV) 50 mL MBP     Admin Date  07/27/2021 Action  New Bag Dose  2 g Rate  100 mL/hr Route  IntraVENous Administered By  Neema Justice RN          doxycycline (VIBRAMYCIN) capsule 100 mg     Admin Date  07/27/2021 Action  Given Dose  100 mg Route  Oral Administered By  Dominick Ruby I          enoxaparin (LOVENOX) injection 40 mg     Admin Date  07/27/2021 Action  Given Dose  40 mg Route  SubCUTAneous Administered By  Ramses Guidry RN          famotidine (PEPCID) tablet 20 mg     Admin Date  07/27/2021 Action  Given Dose  20 mg Route  Oral Administered By  Cain Ramirez I          ondansetron [facility]) injection 4 mg     Admin Date  07/27/2021 Action  Given Dose  4 mg Route  IntraVENous Administered By  Ramses Guidry RN           Admin Date  07/27/2021 Action  Given Dose  4 mg Route  IntraVENous Administered By  Ramses Guidry RN          sodium chloride (NS) flush 5-40 mL     Admin Date  07/27/2021 Action  Given Dose  10 mL Route  IntraVENous Administered By  Cain Ramirez I          sodium chloride 0.9 % bolus infusion 1,000 mL     Admin Date  07/27/2021 Action  New Bag Dose  1,000 mL Route  IntraVENous Administered By  Ramses Guidry RN                  Problem List:     Hospital Problems as of 7/28/2021 Date Reviewed: 5/5/2017        Codes Class Noted - Resolved POA    CAP (community acquired pneumonia) ICD-10-CM: J18.9  ICD-9-CM: 486  7/27/2021 - Present Unknown                 Signed:  Mireya Griffith DO

## 2021-07-28 NOTE — PROGRESS NOTES
Physician Progress Note      PATIENT:               Rocky Middleton  CSN #:                  [id number]  :                       1990  ADMIT DATE:       2021 9:49 AM  DISCH DATE:  RESPONDING  PROVIDER #:        GISSEL VILLELA DO          QUERY TEXT:    Patient admitted with CAP. Noted documentation of acute respiratory distress in H&P. In order to support the diagnosis of acute respiratory distress, please include additional clinical indicators in your documentation. Or please document if the diagnosis of acute respiratory distress has been ruled out after further study. The medical record reflects the following:  Risk Factors: \"RUL infiltrate so suspect underlying CAP\"  Clinical Indicators: \"No overt distress\"; \"Appears even, unlabored\"; \"ED workup notable for resting o2 sat of 89% on RA\"  Treatment: Supplemental O2 maxed at 2L NC    Acute Respiratory Failure Clinical Indicators per [employer] Training Guide and Quick Reference Guide:  Supplemental oxygen of 40% or more  Presence of respiratory distress, tachypnea, dyspnea, shortness of breath, wheezing  pO2 < 60 mmHg  pCO2 > 50 and pH < 7.35  P/F ratio (pO2 / FIO2) < 300  pO2 decrease or pCO2 increase by 10 mmHg from baseline (if known)  Unable to speak in complete sentences  Use of accessory muscles to breathe  Extreme anxiety and feeling of impending doom  Tripod position  Confusion/altered mental status/obtunded  Options provided:  -- Acute Respiratory Distress as evidenced by, Please document evidence. -- Acute Respiratory Distress ruled out after study  -- Other - I will add my own diagnosis  -- Disagree - Not applicable / Not valid  -- Disagree - Clinically unable to determine / Unknown  -- Refer to Clinical Documentation Reviewer    PROVIDER RESPONSE TEXT:    Acute Respiratory Distress has been ruled out after study.     Query created by: Aravind Szymanski on 2021 9:39 AM      Electronically signed by:  Orlando Stanton DO 2021 12:03 PM

## 2021-07-28 NOTE — PROGRESS NOTES
Care Management Interventions  PCP Verified by CM: Yes [facility]  Mode of Transport at Discharge:  (family)  Transition of Care Consult (CM Consult): Discharge Planning  Current Support Network: Lives with Spouse (wife/Mena Gomez #[phone])  Confirm Follow Up Transport: Family  Discharge Location  Discharge Placement: Home    Saw pt in interdisciplinarily rounds with the following disciplines: Physician, Case Management, Nursing, Dietary,Therapy and Pharmacy. The plan of care was discussed along with goals for discharge date/ location. Per  There are no further d/c plans at this time. Pt is coughing up blood and scheduled to have a CT of the chest done.  SW will follow-up w/ pt after test results    Tretha Hodgkins, MSW

## 2021-07-28 NOTE — PROGRESS NOTES
07/27/21 1930   Dual Skin Pressure Injury Assessment   Dual Skin Pressure Injury Assessment WDL   Second Care Provider (Based on [address] [place] [address]) Babita Hunter, RN   Skin Integumentary   Skin Integumentary (WDL) WDL    Pressure  Injury Documentation No Pressure Injury Noted-Pressure Ulcer Prevention Initiated

## 2021-07-29 VITALS
RESPIRATION RATE: 18 BRPM | OXYGEN SATURATION: 90 % | TEMPERATURE: 98.3 F | HEART RATE: 82 BPM | SYSTOLIC BLOOD PRESSURE: 122 MMHG | WEIGHT: 250 LBS | DIASTOLIC BLOOD PRESSURE: 68 MMHG | BODY MASS INDEX: 42.68 KG/M2 | HEIGHT: 64 IN

## 2021-07-29 PROBLEM — J96.01 ACUTE RESPIRATORY FAILURE WITH HYPOXIA (HCC): Status: ACTIVE | Noted: 2021-07-29

## 2021-07-29 PROBLEM — G47.33 OSA (OBSTRUCTIVE SLEEP APNEA): Status: ACTIVE | Noted: 2021-07-29

## 2021-07-29 LAB
ANION GAP SERPL CALC-SCNC: 6 MMOL/L (ref 7–16)
BASOPHILS # BLD: 0.1 K/UL (ref 0–0.2)
BASOPHILS NFR BLD: 1 % (ref 0–2)
BUN SERPL-MCNC: 12 MG/DL (ref 6–23)
CALCIUM SERPL-MCNC: 8.4 MG/DL (ref 8.3–10.4)
CHLORIDE SERPL-SCNC: 107 MMOL/L (ref 98–107)
CO2 SERPL-SCNC: 28 MMOL/L (ref 21–32)
CREAT SERPL-MCNC: 0.73 MG/DL (ref 0.6–1)
DIFFERENTIAL METHOD BLD: ABNORMAL
EOSINOPHIL # BLD: 0.3 K/UL (ref 0–0.8)
EOSINOPHIL NFR BLD: 3 % (ref 0.5–7.8)
ERYTHROCYTE [DISTWIDTH] IN BLOOD BY AUTOMATED COUNT: 14.6 % (ref 11.9–14.6)
GLUCOSE SERPL-MCNC: 92 MG/DL (ref 65–100)
HCT VFR BLD AUTO: 37.3 % (ref 35.8–46.3)
HGB BLD-MCNC: 11.9 G/DL (ref 11.7–15.4)
IMM GRANULOCYTES # BLD AUTO: 0.1 K/UL (ref 0–0.5)
IMM GRANULOCYTES NFR BLD AUTO: 1 % (ref 0–5)
LYMPHOCYTES # BLD: 1.8 K/UL (ref 0.5–4.6)
LYMPHOCYTES NFR BLD: 19 % (ref 13–44)
MCH RBC QN AUTO: 26.4 PG (ref 26.1–32.9)
MCHC RBC AUTO-ENTMCNC: 31.9 G/DL (ref 31.4–35)
MCV RBC AUTO: 82.7 FL (ref 79.6–97.8)
MONOCYTES # BLD: 0.8 K/UL (ref 0.1–1.3)
MONOCYTES NFR BLD: 9 % (ref 4–12)
NEUTS SEG # BLD: 6.2 K/UL (ref 1.7–8.2)
NEUTS SEG NFR BLD: 67 % (ref 43–78)
NRBC # BLD: 0 K/UL (ref 0–0.2)
PLATELET # BLD AUTO: 293 K/UL (ref 150–450)
PMV BLD AUTO: 9.1 FL (ref 9.4–12.3)
POTASSIUM SERPL-SCNC: 3.5 MMOL/L (ref 3.5–5.1)
RBC # BLD AUTO: 4.51 M/UL (ref 4.05–5.2)
SODIUM SERPL-SCNC: 141 MMOL/L (ref 136–145)
WBC # BLD AUTO: 9.3 K/UL (ref 4.3–11.1)

## 2021-07-29 PROCEDURE — 94760 N-INVAS EAR/PLS OXIMETRY 1: CPT

## 2021-07-29 PROCEDURE — 85025 COMPLETE CBC W/AUTO DIFF WBC: CPT

## 2021-07-29 PROCEDURE — 74011250636 HC RX REV CODE- 250/636: Performed by: INTERNAL MEDICINE

## 2021-07-29 PROCEDURE — 94640 AIRWAY INHALATION TREATMENT: CPT

## 2021-07-29 PROCEDURE — 74011250637 HC RX REV CODE- 250/637: Performed by: INTERNAL MEDICINE

## 2021-07-29 PROCEDURE — 36415 COLL VENOUS BLD VENIPUNCTURE: CPT

## 2021-07-29 PROCEDURE — 74011000250 HC RX REV CODE- 250: Performed by: INTERNAL MEDICINE

## 2021-07-29 PROCEDURE — 94761 N-INVAS EAR/PLS OXIMETRY MLT: CPT

## 2021-07-29 PROCEDURE — 80048 BASIC METABOLIC PNL TOTAL CA: CPT

## 2021-07-29 RX ORDER — CEFPODOXIME PROXETIL 200 MG/1
200 TABLET, FILM COATED ORAL EVERY 12 HOURS
Status: DISCONTINUED | OUTPATIENT
Start: 2021-07-29 | End: 2021-07-29 | Stop reason: HOSPADM

## 2021-07-29 RX ORDER — DOXYCYCLINE 100 MG/1
100 CAPSULE ORAL EVERY 12 HOURS
Qty: 10 CAPSULE | Refills: 0 | Status: SHIPPED | OUTPATIENT
Start: 2021-07-29 | End: 2021-08-03

## 2021-07-29 RX ORDER — GUAIFENESIN DEXTROMETHORPHAN HYDROBROMIDE ORAL SOLUTION 10; 100 MG/5ML; MG/5ML
10 SOLUTION ORAL
Qty: 1 BOTTLE | Refills: 0 | Status: SHIPPED | OUTPATIENT
Start: 2021-07-29

## 2021-07-29 RX ORDER — CEFPODOXIME PROXETIL 200 MG/1
200 TABLET, FILM COATED ORAL EVERY 12 HOURS
Qty: 10 TABLET | Refills: 0 | Status: SHIPPED | OUTPATIENT
Start: 2021-07-29 | End: 2021-08-03

## 2021-07-29 RX ORDER — ALBUTEROL SULFATE 0.83 MG/ML
2.5 SOLUTION RESPIRATORY (INHALATION)
Status: DISCONTINUED | OUTPATIENT
Start: 2021-07-29 | End: 2021-07-29 | Stop reason: HOSPADM

## 2021-07-29 RX ORDER — FLUTICASONE PROPIONATE 50 MCG
2 SPRAY, SUSPENSION (ML) NASAL DAILY
Qty: 1 BOTTLE | Refills: 0 | Status: SHIPPED | OUTPATIENT
Start: 2021-07-29

## 2021-07-29 RX ORDER — DEXAMETHASONE 4 MG/1
4 TABLET ORAL
Qty: 4 TABLET | Refills: 0 | Status: SHIPPED | OUTPATIENT
Start: 2021-07-29

## 2021-07-29 RX ORDER — ESCITALOPRAM OXALATE 10 MG/1
20 TABLET ORAL DAILY
Qty: 1 TABLET | Refills: 0 | Status: SHIPPED
Start: 2021-07-29

## 2021-07-29 RX ADMIN — FAMOTIDINE 20 MG: 20 TABLET ORAL at 09:03

## 2021-07-29 RX ADMIN — ACETAMINOPHEN 650 MG: 325 TABLET, FILM COATED ORAL at 06:04

## 2021-07-29 RX ADMIN — FLUTICASONE PROPIONATE 2 SPRAY: 50 SPRAY, METERED NASAL at 09:03

## 2021-07-29 RX ADMIN — ONDANSETRON 4 MG: 2 INJECTION INTRAMUSCULAR; INTRAVENOUS at 05:58

## 2021-07-29 RX ADMIN — Medication 10 ML: at 05:50

## 2021-07-29 RX ADMIN — DEXAMETHASONE 4 MG: 4 TABLET ORAL at 09:03

## 2021-07-29 RX ADMIN — IPRATROPIUM BROMIDE AND ALBUTEROL SULFATE 3 ML: .5; 3 SOLUTION RESPIRATORY (INHALATION) at 07:32

## 2021-07-29 RX ADMIN — ENOXAPARIN SODIUM 40 MG: 40 INJECTION SUBCUTANEOUS at 05:58

## 2021-07-29 RX ADMIN — ESCITALOPRAM OXALATE 20 MG: 10 TABLET ORAL at 09:03

## 2021-07-29 RX ADMIN — CEFPODOXIME PROXETIL 200 MG: 200 TABLET, FILM COATED ORAL at 09:43

## 2021-07-29 RX ADMIN — DOXYCYCLINE HYCLATE 100 MG: 100 CAPSULE ORAL at 09:03

## 2021-07-29 NOTE — DISCHARGE SUMMARY
Hospitalist Discharge Summary     Patient ID:  Swetha Friedman Page  [id number]  31 y.o.  1990  Admit date: 7/27/2021  Discharge date: 7/29/2021  Attending: Yulisa Barahona DO  PCP:  George Freed, Not On File  Treatment Team: Attending Provider: Yulisa Barahona DO; Care Manager: Elin Masterson RN; Utilization Review: Albert Mac RN; Staff Nurse: Baljit Durham RN    Principal Diagnosis Acute respiratory failure with hypoxia [facility])    Hospital Problems as of 7/29/2021 Date Reviewed: 5/5/2017        Codes Class Noted - Resolved POA    GAURANG (obstructive sleep apnea) ICD-10-CM: G47.33  ICD-9-CM: 327.23  7/29/2021 - Present Yes        * (Principal) Acute respiratory failure with hypoxia ([facility] [address] ICD-10-CM: J96.01  ICD-9-CM: 518.81  7/29/2021 - Present Yes        Bacterial pneumonia ICD-10-CM: J15.9  ICD-9-CM: 482.9  7/28/2021 - Present Yes        Rhinovirus ICD-10-CM: B34.8  ICD-9-CM: 079.3  7/28/2021 - Present Yes        CAP (community acquired pneumonia) ICD-10-CM: J18.9  ICD-9-CM: 486  7/27/2021 - Present Yes        Obesity, morbid ([facility] [address] ICD-10-CM: E66.01  ICD-9-CM: 278.01  4/11/2018 - Present Yes        Allergic rhinitis ICD-10-CM: J30.9  ICD-9-CM: 477.9  4/11/2018 - Present Yes        GERD without esophagitis ICD-10-CM: K21.9  ICD-9-CM: 530.81  4/11/2018 - Present Yes        Tobacco dependence ICD-10-CM: F17.200  ICD-9-CM: 305.1  4/11/2018 - Present Yes        Anxiety ICD-10-CM: F41.9  ICD-9-CM: 300.00  10/19/2016 - Present Yes        RESOLVED: Infection, adenovirus ICD-10-CM: B34.0  ICD-9-CM: 079.0  7/28/2021 - 7/28/2021 Yes              Hospital Course:  Please refer to the admission H&P for details of presentation. In summary, the patient is a 31 y. o. female with medical history of  MO, anxiety, tob abuse, allergic rhinitis who presented to ED with report of non productive cough, dyspnea, wheezing for previous 3-4 days.  Has not had covid vaccine as she reports bad reaction to vaccines in the past. She denies sick contacts. No high fevers recorded, subjective chills. She admits to compliance with CPAP and says she \"missed one night and woke up the next morning like this\". ED workup notable for resting o2 sat of 88% on RA. CXR notable for RUL opacity. Admitted for CAP, IV atbx. Resp viral panel pos for rhinovirus as well. She was started on Ceftriaxone and Doxycycline IV. She continued to feel SOB so she was started on Decadron. She slowly improved and came off of oxygen. She had a walk test and stayed >90% with ambulation. She felt better and was discharged home in stable condition. Significant Diagnostic Studies:    Labs: Results:       Chemistry Recent Labs     07/29/21 0458 07/28/21 0519 07/27/21  1117   GLU 92 84 85    141 140   K 3.5 4.1 3.8    109* 108*   CO2 28 29 26   BUN 12 13 13   CREA 0.73 0.75 0.76   CA 8.4 8.2* 8.6   AGAP 6* 3* 6*   AP  --   --  69   TP  --   --  7.6   ALB  --   --  3.7   GLOB  --   --  3.9*   AGRAT  --   --  0.9*      CBC w/Diff Recent Labs     07/29/21 0458 07/28/21 0519 07/27/21  1117   WBC 9.3 8.6 12.9*   RBC 4.51 4.64 5.07   HGB 11.9 12.1 13.5   HCT 37.3 38.8 41.1    273 307   GRANS 67 59 72   LYMPH 19 22 15   EOS 3 6 3      Cardiac Enzymes No results for input(s): CPK, CKND1, JASMIN in the last 72 hours. No lab exists for component: CKRMB, TROIP   Coagulation No results for input(s): PTP, INR, APTT, INREXT in the last 72 hours. Lipid Panel No results found for: CHOL, CHOLPOCT, CHOLX, CHLST, CHOLV, 647025, HDL, HDLP, LDL, LDLC, DLDLP, 703185, VLDLC, VLDL, TGLX, TRIGL, TRIGP, TGLPOCT, CHHD, CHHDX   BNP No results for input(s): BNPP in the last 72 hours.    Liver Enzymes Recent Labs     07/27/21  1117   TP 7.6   ALB 3.7   AP 69      Thyroid Studies Lab Results   Component Value Date/Time    TSH 1.880 05/05/2017 08:57 AM            Imaging:  XR CHEST PA LAT    Result Date: 7/27/2021  Moderate somewhat linear opacity in the right upper lobe which may be atelectasis from mucous plugging or pneumonia. Trace adjacent right pleural fluid. CT CHEST W CONT    Result Date: 7/28/2021  1. Airspace consolidation in the caudal aspect of the right upper lobe with more peripheral anterior hazy groundglass opacities also in the right upper lobe. An atypical infectious or inflammatory process is suspected. Microbiology/Cultures: All Micro Results     Procedure Component Value Units Date/Time    CULTURE, BLOOD [[id number]] Collected: 07/27/21 1753    Order Status: Completed Specimen: Blood Updated: 07/29/21 0716     Special Requests: --        LEFT  Antecubital       Culture result: NO GROWTH 2 DAYS       CULTURE, BLOOD [[id number]] Collected: 07/27/21 2033    Order Status: Completed Specimen: Blood Updated: 07/29/21 0716     Special Requests: --        RIGHT  HAND       Culture result: NO GROWTH 2 DAYS       CULTURE, RESPIRATORY/SPUTUM/BRONCH Waynard Pock STAIN [[id number]] Collected: 07/27/21 1828    Order Status: Completed Specimen: Sputum Updated: 07/28/21 1239     Special Requests: NO SPECIAL REQUESTS        GRAM STAIN 10 TO 16 WBC'S/OIF      0 TO 1 EPITHELIAL CELLS SEEN /OIF            MODERATE GRAM NEGATIVE RODS            FEW GRAM POSITIVE COCCI         4+ MUCUS PRESENT        Culture result:       CULTURE IN PROGRESS,FURTHER UPDATES TO FOLLOW          SARS-COV-2, PCR [[id number]] Collected: 07/27/21 1117    Order Status: Completed Specimen: Nasopharyngeal Updated: 07/28/21 0801     Specimen source Nasopharyngeal        SARS-CoV-2 Not detected        Comment:      The specimen is NEGATIVE for SARS-CoV-2, the novel coronavirus associated with COVID-19. This test has been authorized by the FDA under an Emergency Use Authorization (EUA) for use by authorized laboratories.         Fact sheet for Healthcare Providers: ConventionUpdate.co.nz       Fact sheet for Patients: ConventionUpdate.co.nz       Methodology: RT-PCR RESPIRATORY VIRUS PANEL W/COVID-19, PCR [[id number]]  (Abnormal) Collected: 07/27/21 1351    Order Status: Completed Specimen: Nasopharyngeal Updated: 07/27/21 1706     Adenovirus NOT DETECTED        Coronavirus 229E NOT DETECTED        Coronavirus HKU1 NOT DETECTED        Coronavirus CVNL63 NOT DETECTED        Coronavirus OC43 NOT DETECTED        SARS-CoV-2, PCR NOT DETECTED        Metapneumovirus NOT DETECTED        Rhinovirus and Enterovirus Detected        Influenza A NOT DETECTED        Influenza B NOT DETECTED        Parainfluenza 1 NOT DETECTED        Parainfluenza 2 NOT DETECTED        Parainfluenza 3 NOT DETECTED        Parainfluenza virus 4 NOT DETECTED        RSV by PCR NOT DETECTED        B. parapertussis, PCR NOT DETECTED        Bordetella pertussis - PCR NOT DETECTED        Chlamydophila pneumoniae DNA, QL, PCR NOT DETECTED        Mycoplasma pneumoniae DNA, QL, PCR NOT DETECTED       COVID-19 RAPID TEST [[id number]] Collected: 07/27/21 1117    Order Status: Completed Specimen: Nasopharyngeal Updated: 07/27/21 1154     Specimen source Nasopharyngeal        COVID-19 rapid test Not detected        Comment:      The specimen is NEGATIVE for SARS-CoV-2, the novel coronavirus associated with COVID-19. A negative result does not rule out COVID-19. This test has been authorized by the FDA under an Emergency Use Authorization (EUA) for use by authorized laboratories.         Fact sheet for Healthcare Providers: ConventionUpdate.co.nz  Fact sheet for Patients: ConventionUpdate.co.nz       Methodology: Isothermal Nucleic Acid Amplification         COVID-19 RAPID TEST [[id number]] Collected: 07/27/21 1117    Order Status: Canceled           Discharge Exam:  Visit Vitals  /68 (BP 1 Location: Right lower arm, BP Patient Position: Supine)   Pulse 82   Temp 98.3 °F (36.8 °C)   Resp 18   Ht 5' 4\" (1.626 m)   Wt 113.4 kg (250 lb)   SpO2 90%   BMI 42.91 kg/m² General appearance: alert, cooperative, no distress, appears stated age  Lungs: clear to auscultation bilaterally  Heart: regular rate and rhythm, S1, S2 normal, no murmur, click, rub or gallop  Abdomen: soft, non-tender. Bowel sounds normal. No masses,  no organomegaly  Extremities: no cyanosis or edema  Neurologic: Grossly normal    Disposition: home  Discharge Condition: stable  Patient Instructions:   Current Discharge Medication List      START taking these medications    Details   cefpodoxime (VANTIN) 200 mg tablet Take 1 Tablet by mouth every twelve (12) hours for 5 days. Qty: 10 Tablet, Refills: 0  Start date: 7/29/2021, End date: 8/3/2021      dexAMETHasone (DECADRON) 4 mg tablet Take 4 mg by mouth Daily (before breakfast). Qty: 4 Tablet, Refills: 0  Start date: 7/29/2021      doxycycline (VIBRAMYCIN) 100 mg capsule Take 1 Capsule by mouth every twelve (12) hours for 5 days. Qty: 10 Capsule, Refills: 0  Start date: 7/29/2021, End date: 8/3/2021      guaiFENesin-dextromethorphan (TUSSI-ORGANIDIN DM)  mg/5 mL liqd Take 10 mL by mouth every four (4) hours as needed for Cough or Congestion. Qty: 1 Bottle, Refills: 0  Start date: 7/29/2021         CONTINUE these medications which have CHANGED    Details   escitalopram oxalate (LEXAPRO) 10 mg tablet Take 2 Tablets by mouth daily. 1 every day for anxiety control  Qty: 1 Tablet, Refills: 0  Start date: 7/29/2021    Associated Diagnoses: Anxiety      fluticasone propionate (FLONASE) 50 mcg/actuation nasal spray 2 Sprays by Both Nostrils route daily. Indications: inflammation of the nose due to an allergy  Qty: 1 Bottle, Refills: 0  Start date: 7/29/2021         CONTINUE these medications which have NOT CHANGED    Details   famotidine (PEPCID PO) Take  by mouth. cpap machine kit by Does Not Apply route.       omeprazole (PRILOSEC) 20 mg capsule 1 every day for acid reflux/ulcers  Indications: gastroesophageal reflux disease  Qty: 90 Cap, Refills: 12 Associated Diagnoses: GERD without esophagitis      multivitamin (ONE A DAY) tablet Take 1 Tab by mouth daily. hydrocortisone-pramoxine (ANALPRAM-HC) 2.5-1 % (4g) cream Apply bid to tid prn     Use closest generic option.   Disp large tube  Qty: 1 Tube, Refills: 0         STOP taking these medications       CHROM TENISHA/BRINDAL BERRY (GARCINIA CAMBOGIA PO) Comments:   Reason for Stopping:               Activity: Activity as tolerated  Diet: Regular Diet  Wound Care: None needed    Follow-up  ·   PCP in one week  Time spent to discharge patient 35 minutes  Signed:  Kellen Chandler DO  7/29/2021  9:24 AM

## 2021-07-29 NOTE — PROGRESS NOTES
Care Management Interventions  PCP Verified by CM: Yes (Dr. Vicki Irvin @ [facility])  Mode of Transport at Discharge:  (family)  Transition of Care Consult (CM Consult): Discharge Planning  Current Support Network: Lives with Spouse (wife/Mena Gomez #[phone])  Confirm Follow Up Transport: Family  Discharge Location  Discharge Placement: Home    Saw pt in interdisciplinarily rounds with the following disciplines: Physician, Case Management, Nursing, Dietary,Therapy and Pharmacy. The plan of care was discussed along with goals for discharge date/ location. Per Dr. Hoyos Julito is medically stable and agreeable to d/c. Pt was evaluated for O2 prior to d/c and does not need O2. Pt does not have any concerns or needs.     FABIANO Bess

## 2021-07-29 NOTE — PROGRESS NOTES
RT Evaluation for Home Oxygen    Resting (Room Air)  SpO2: 95  HR: 84      During Walk (Room Air)  SpO2: 91  HR: 125  Walk/Assistance Device: Ambulation  Rate of Dyspnea: Increased RR 20-27  Symptoms:    Comments:        After Walk  SpO2: 93  HR: 112  O2 Device:    O2 Flow Rate (L/min):    FIO2 (%):    Rate of Dyspnea: Regular pattern RR 12-20  Symptoms:    Comments:   No complaints, exited to go home. Additional Comments: pt stated that she could feel when her oxygen drops bellow normal range, she stated she feels really lightheaded.      Electronically signed by RT Sunshine on 7/29/2021 at 9:33 AM

## 2021-07-29 NOTE — PROGRESS NOTES
Hospitalist Progress Note     Name:  Shaina Motley  Age:30 y.o. Sex:female   :  1990    MRN:  [id number]   Admit Date:  2021    Presenting Complaint: Vomiting and Cough    Initial Admission Diagnosis: CAP (community acquired pneumonia) [J18.9]     Hospital Course/Interval history:     Shaina Motley is a 31 y.o. female with medical history of  MO, anxiety, tob abuse, allergic rhinitis who presented to ED with report of non productive cough, dyspnea, wheezing for previous 3-4 days. Has not had covid vaccine as she reports bad reaction to vaccines in the past. She denies sick contacts. No high fevers recorded, subjective chills. She admits to compliance with CPAP and says she \"missed one night and woke up the next morning like this\"     ED workup notable for resting o2 sat of 88% on RA     CXR notable for RUL opacity. Admitted for CAP, IV atbx. Resp viral panel pos for rhinovirus as well      Subjective (21):  Heavy coughing this morning with blood streaked sputum. Feeling better by mid morning. Partner at bedside. Assessment & Plan     # Acute hypoxic respiratory Failure (88% on RA)  - Resp viral panel Pos for rhinovirus  - also with RUL infiltrate so suspect underlying CAP  - Rocephin/doxycline D2  - PRN albuterol  - follow blood/sputum cultures  - wean oxygen as tolerated  - CT chest w/ RUL infiltrates     - add decadron for control of wheezing  - will need outpatient pulm eval for PFTs (smoker and ?asthmatic) as well as GAURANG/CPAP mgmt (she got hers online)     # GERD  - H2 blocker     # BMI 42     # depression  - cont lexapor    # tob abuse  - cessation counseling     # GAURANG  - wears CPAP at home, cont IP  - outpt pulm referral at dc       Dispo/Discharge Planning:  Wean oxygen, continue atbx/steroids.      Diet:  ADULT DIET Regular  DVT PPx: scd  Code status: Full Code    Objective:     Patient Vitals for the past 24 hrs:   Temp Pulse Resp BP SpO2   21 -- -- -- -- 95 %   07/28/21 1902 98.4 °F (36.9 °C) 94 20 109/67 92 %   07/28/21 1443 98.2 °F (36.8 °C) 86 17 124/72 97 %   07/28/21 1438 -- -- -- -- 95 %   07/28/21 1141 98.4 °F (36.9 °C) 83 16 123/78 90 %   07/28/21 0924 -- -- -- -- 95 %   07/28/21 0728 97.9 °F (36.6 °C) 74 16 128/79 94 %   07/28/21 0335 98.4 °F (36.9 °C) 74 18 135/81 94 %   07/27/21 2309 98.7 °F (37.1 °C) 92 18 116/65 92 %     Oxygen Therapy  O2 Sat (%): 95 % (07/28/21 1929)  Pulse via Oximetry: 86 beats per minute (07/28/21 1929)  O2 Device: Nasal cannula (07/28/21 1929)  Skin Assessment: Clean, dry, & intact (07/28/21 1929)  Skin Protection for O2 Device: N/A (07/28/21 1929)  O2 Flow Rate (L/min): 2 l/min (07/28/21 1929)    Body mass index is 42.91 kg/m². Physical Exam:   General:    Well nourished. No overt distress obese  Eyes:  Sclerae appear normal.  Extraocular movements intact. HENT:  Normocephalic, atraumatic. Nares appear normal, no drainage. Moist mucous membranes  Neck:  Supple. No restricted ROM. CV:   RRR. No m/r/g. No peripheral edema. No JVD  Lungs:   Scattered exp wheezing and rhonchi bilaterally  Abdomen: Bowel sounds present. Soft, nontender, nondistended. Extremities: Warm and dry. No cyanosis or clubbing  Skin:     No rashes. Normal turgor. Normal coloration  Neuro:  Cranial nerves II-XII grossly intact. No focal weakness or numbness  Psych:  Normal mood and affect.   Calm    Data Review:  I have reviewed all labs, meds, and studies from the last 24 hours:    Labs:    Recent Results (from the past 24 hour(s))   CBC WITH AUTOMATED DIFF    Collection Time: 07/28/21  5:19 AM   Result Value Ref Range    WBC 8.6 4.3 - 11.1 K/uL    RBC 4.64 4.05 - 5.2 M/uL    HGB 12.1 11.7 - 15.4 g/dL    HCT 38.8 35.8 - 46.3 %    MCV 83.6 79.6 - 97.8 FL    MCH 26.1 26.1 - 32.9 PG    MCHC 31.2 (L) 31.4 - 35.0 g/dL    RDW 14.6 11.9 - 14.6 %    PLATELET 108 601 - 988 K/uL    MPV 9.3 (L) 9.4 - 12.3 FL    ABSOLUTE NRBC 0.00 0.0 - 0.2 K/uL    DF AUTOMATED      NEUTROPHILS 59 43 - 78 %    LYMPHOCYTES 22 13 - 44 %    MONOCYTES 12 4.0 - 12.0 %    EOSINOPHILS 6 0.5 - 7.8 %    BASOPHILS 1 0.0 - 2.0 %    IMMATURE GRANULOCYTES 1 0.0 - 5.0 %    ABS. NEUTROPHILS 5.0 1.7 - 8.2 K/UL    ABS. LYMPHOCYTES 1.9 0.5 - 4.6 K/UL    ABS. MONOCYTES 1.1 0.1 - 1.3 K/UL    ABS. EOSINOPHILS 0.5 0.0 - 0.8 K/UL    ABS. BASOPHILS 0.1 0.0 - 0.2 K/UL    ABS. IMM. GRANS. 0.1 0.0 - 0.5 K/UL   METABOLIC PANEL, BASIC    Collection Time: 07/28/21  5:19 AM   Result Value Ref Range    Sodium 141 136 - 145 mmol/L    Potassium 4.1 3.5 - 5.1 mmol/L    Chloride 109 (H) 98 - 107 mmol/L    CO2 29 21 - 32 mmol/L    Anion gap 3 (L) 7 - 16 mmol/L    Glucose 84 65 - 100 mg/dL    BUN 13 6 - 23 MG/DL    Creatinine 0.75 0.6 - 1.0 MG/DL    GFR est AA >60 >60 ml/min/1.73m2    GFR est non-AA >60 >60 ml/min/1.73m2    Calcium 8.2 (L) 8.3 - 10.4 MG/DL       All Micro Results     Procedure Component Value Units Date/Time    CULTURE, RESPIRATORY/SPUTUM/BRONCH Dorrene Stalls [[id number]] Collected: 07/27/21 1828    Order Status: Completed Specimen: Sputum Updated: 07/28/21 1239     Special Requests: NO SPECIAL REQUESTS        GRAM STAIN 10 TO 16 WBC'S/OIF      0 TO 1 EPITHELIAL CELLS SEEN /OIF            MODERATE GRAM NEGATIVE RODS            FEW GRAM POSITIVE COCCI         4+ MUCUS PRESENT        Culture result:       CULTURE IN PROGRESS,FURTHER UPDATES TO FOLLOW          SARS-COV-2, PCR [[id number]] Collected: 07/27/21 1117    Order Status: Completed Specimen: Nasopharyngeal Updated: 07/28/21 0801     Specimen source Nasopharyngeal        SARS-CoV-2 Not detected        Comment:      The specimen is NEGATIVE for SARS-CoV-2, the novel coronavirus associated with COVID-19. This test has been authorized by the FDA under an Emergency Use Authorization (EUA) for use by authorized laboratories.         Fact sheet for Healthcare Providers: ConventionUpdate.co.nz       Fact sheet for Patients: iTendency.uy       Methodology: RT-PCR         CULTURE, BLOOD [[id number]] Collected: 07/27/21 1753    Order Status: Completed Specimen: Blood Updated: 07/28/21 0734     Special Requests: --        LEFT  Antecubital       Culture result: NO GROWTH AFTER 13 HOURS       CULTURE, BLOOD [[id number]] Collected: 07/27/21 2033    Order Status: Completed Specimen: Blood Updated: 07/28/21 0734     Special Requests: --        RIGHT  HAND       Culture result: NO GROWTH AFTER 10 HOURS       RESPIRATORY VIRUS PANEL W/COVID-19, PCR [[id number]]  (Abnormal) Collected: 07/27/21 1351    Order Status: Completed Specimen: Nasopharyngeal Updated: 07/27/21 1706     Adenovirus NOT DETECTED        Coronavirus 229E NOT DETECTED        Coronavirus HKU1 NOT DETECTED        Coronavirus CVNL63 NOT DETECTED        Coronavirus OC43 NOT DETECTED        SARS-CoV-2, PCR NOT DETECTED        Metapneumovirus NOT DETECTED        Rhinovirus and Enterovirus Detected        Influenza A NOT DETECTED        Influenza B NOT DETECTED        Parainfluenza 1 NOT DETECTED        Parainfluenza 2 NOT DETECTED        Parainfluenza 3 NOT DETECTED        Parainfluenza virus 4 NOT DETECTED        RSV by PCR NOT DETECTED        B. parapertussis, PCR NOT DETECTED        Bordetella pertussis - PCR NOT DETECTED        Chlamydophila pneumoniae DNA, QL, PCR NOT DETECTED        Mycoplasma pneumoniae DNA, QL, PCR NOT DETECTED       COVID-19 RAPID TEST [[id number]] Collected: 07/27/21 1117    Order Status: Completed Specimen: Nasopharyngeal Updated: 07/27/21 1154     Specimen source Nasopharyngeal        COVID-19 rapid test Not detected        Comment:      The specimen is NEGATIVE for SARS-CoV-2, the novel coronavirus associated with COVID-19. A negative result does not rule out COVID-19. This test has been authorized by the FDA under an Emergency Use Authorization (EUA) for use by authorized laboratories.         Fact sheet for Healthcare Providers: MazinNotizzadate.co.nz  Fact sheet for Patients: AutoBikedate.co.nz       Methodology: Isothermal Nucleic Acid Amplification         COVID-19 RAPID TEST [[id number]] Collected: 07/27/21 1117    Order Status: Canceled           EKG Results     None          Other Studies:  CT CHEST W CONT    Result Date: 7/28/2021  CT of the chest with contrast. CLINICAL INDICATION: Hemoptysis, cough PROCEDURE: Serial thin section axial images obtained from the thoracic inlet through the upper abdomen following the administration of 80 cc of Optiray-350 intravenous contrast.  Radiation dose reduction techniques were used for this study. Our CT scanners use one or all of the following: Automated exposure control, adjusted of the mA and/or kV according to patient size, iterative reconstruction COMPARISON:No prior FINDINGS: No mediastinal or axillary adenopathy. There is no pleural or pericardial effusion. Dense airspace consolidation is appreciated with a linear morphology at the caudal aspect of the right upper lobe along the fissure. An atypical infectious or inflammatory process is favored. There are more peripheral groundglass opacities noted anteriorly in the right upper lobe. There is no pleural effusion or pneumothorax. No airspace consolidation evident in the left lung. Limited evaluation of the upper abdomen is unremarkable. No aggressive osseous is identified. 1. Airspace consolidation in the caudal aspect of the right upper lobe with more peripheral anterior hazy groundglass opacities also in the right upper lobe. An atypical infectious or inflammatory process is suspected.        Current Meds:   Current Facility-Administered Medications   Medication Dose Route Frequency    albuterol-ipratropium (DUO-NEB) 2.5 MG-0.5 MG/3 ML  3 mL Nebulization Q6H RT    nicotine (NICODERM CQ) 21 mg/24 hr patch 1 Patch  1 Patch TransDERmal Q24H    [START ON 7/29/2021] dexAMETHasone (DECADRON) tablet 4 mg  4 mg Oral DAILY    escitalopram oxalate (LEXAPRO) tablet 20 mg  20 mg Oral DAILY    famotidine (PEPCID) tablet 20 mg  20 mg Oral BID    fluticasone propionate (FLONASE) 50 mcg/actuation nasal spray 2 Spray  2 Spray Both Nostrils DAILY    cefTRIAXone (ROCEPHIN) 2 g in 0.9% sodium chloride (MBP/ADV) 50 mL MBP  2 g IntraVENous Q24H    doxycycline (VIBRAMYCIN) capsule 100 mg  100 mg Oral Q12H    sodium chloride (NS) flush 5-40 mL  5-40 mL IntraVENous Q8H    sodium chloride (NS) flush 5-40 mL  5-40 mL IntraVENous PRN    acetaminophen (TYLENOL) tablet 650 mg  650 mg Oral Q6H PRN    Or    acetaminophen (TYLENOL) suppository 650 mg  650 mg Rectal Q6H PRN    polyethylene glycol (MIRALAX) packet 17 g  17 g Oral DAILY PRN    ondansetron (ZOFRAN ODT) tablet 4 mg  4 mg Oral Q8H PRN    Or    ondansetron (ZOFRAN) injection 4 mg  4 mg IntraVENous Q6H PRN    enoxaparin (LOVENOX) injection 40 mg  40 mg SubCUTAneous Q12H       Problem List:  Hospital Problems as of 7/28/2021 Date Reviewed: 5/5/2017        Codes Class Noted - Resolved POA    Infection, adenovirus ICD-10-CM: B34.0  ICD-9-CM: 079.0  7/28/2021 - Present Unknown        CAP (community acquired pneumonia) ICD-10-CM: J18.9  ICD-9-CM: 553  7/27/2021 - Present Unknown                 Part of this note may have been written by using a voice dictation software. The note has been proof read but may still contain some grammatical/other typographical errors.     Signed:  Cyndra Lennox, DO

## 2021-07-29 NOTE — PROGRESS NOTES
Care Management Interventions  PCP Verified by CM: Yes ([facility]  Mode of Transport at Discharge:  (family)  Transition of Care Consult (CM Consult): Discharge Planning  Current Support Network: Lives with Spouse (wife/Mena Gomez #[phone])  Confirm Follow Up Transport: Family  Discharge Location  Discharge Placement: Home    Saw pt in interdisciplinarily rounds with the following disciplines: Physician, Case Management, Nursing, Dietary,Therapy and Pharmacy. The plan of care was discussed along with goals for discharge date/ location. Per Dr. Sal Ee is medically stable and agreeable to d/c. Pt was evaluated for O2 prior to d/c and does not need O2. Pt does not have any concerns or needs.     Marshall Villalta, MSW

## 2021-07-29 NOTE — PROGRESS NOTES
Hospitalist Progress Note     Name:  Bee Motley  Age:30 y.o. Sex:female   :  1990    MRN:  [id number]   Admit Date:  2021    Presenting Complaint: Vomiting and Cough    Initial Admission Diagnosis: CAP (community acquired pneumonia) [J18.9]     Hospital Course/Interval history:     Bee Motley is a 31 y.o. female with medical history of  MO, anxiety, tob abuse, allergic rhinitis who presented to ED with report of non productive cough, dyspnea, wheezing for previous 3-4 days. Has not had covid vaccine as she reports bad reaction to vaccines in the past. She denies sick contacts. No high fevers recorded, subjective chills. She admits to compliance with CPAP and says she \"missed one night and woke up the next morning like this\"     ED workup notable for resting o2 sat of 88% on RA     CXR notable for RUL opacity. Admitted for CAP, IV atbx. Resp viral panel pos for rhinovirus as well      Subjective (21):  Heavy coughing this morning with blood streaked sputum. Feeling better by mid morning. Partner at bedside. Assessment & Plan     # Acute hypoxic respiratory Failure (88% on RA)  - Resp viral panel Pos for rhinovirus  - also with RUL infiltrate so suspect underlying CAP  - Rocephin/doxycline D2  - PRN albuterol  - follow blood/sputum cultures  - wean oxygen as tolerated  - CT chest w/ RUL infiltrates     - add decadron for control of wheezing  - will need outpatient pulm eval for PFTs (smoker and ?asthmatic) as well as GAURANG/CPAP mgmt (she got hers online)     # GERD  - H2 blocker     # BMI 42     # depression  - cont lexapor    # tob abuse  - cessation counseling     # GAURANG  - wears CPAP at home, cont IP  - outpt pulm referral at dc       Dispo/Discharge Planning:  Wean oxygen, continue atbx/steroids.      Diet:  ADULT DIET Regular  DVT PPx: scd  Code status: Full Code    Objective:     Patient Vitals for the past 24 hrs:   Temp Pulse Resp BP SpO2   21 2120     95 %   07/28/21 1902 98.4 °F (36.9 °C) 94 20 109/67 92 %   07/28/21 1443 98.2 °F (36.8 °C) 86 17 124/72 97 %   07/28/21 1438     95 %   07/28/21 1141 98.4 °F (36.9 °C) 83 16 123/78 90 %   07/28/21 0924     95 %   07/28/21 0728 97.9 °F (36.6 °C) 74 16 128/79 94 %   07/28/21 0335 98.4 °F (36.9 °C) 74 18 135/81 94 %   07/27/21 2309 98.7 °F (37.1 °C) 92 18 116/65 92 %     Oxygen Therapy  O2 Sat (%): 95 % (07/28/21 1929)  Pulse via Oximetry: 86 beats per minute (07/28/21 1929)  O2 Device: Nasal cannula (07/28/21 1929)  Skin Assessment: Clean, dry, & intact (07/28/21 1929)  Skin Protection for O2 Device: N/A (07/28/21 1929)  O2 Flow Rate (L/min): 2 l/min (07/28/21 1929)    Body mass index is 42.91 kg/m². Physical Exam:   General:    Well nourished. No overt distress obese  Eyes:  Sclerae appear normal.  Extraocular movements intact. HENT:  Normocephalic, atraumatic. Nares appear normal, no drainage. Moist mucous membranes  Neck:  Supple. No restricted ROM. CV:   RRR. No m/r/g. No peripheral edema. No JVD  Lungs:   Scattered exp wheezing and rhonchi bilaterally  Abdomen: Bowel sounds present. Soft, nontender, nondistended. Extremities: Warm and dry. No cyanosis or clubbing  Skin:     No rashes. Normal turgor. Normal coloration  Neuro:  Cranial nerves II-XII grossly intact. No focal weakness or numbness  Psych:  Normal mood and affect.   Calm    Data Review:  I have reviewed all labs, meds, and studies from the last 24 hours:    Labs:    Recent Results (from the past 24 hour(s))   CBC WITH AUTOMATED DIFF    Collection Time: 07/28/21  5:19 AM   Result Value Ref Range    WBC 8.6 4.3 - 11.1 K/uL    RBC 4.64 4.05 - 5.2 M/uL    HGB 12.1 11.7 - 15.4 g/dL    HCT 38.8 35.8 - 46.3 %    MCV 83.6 79.6 - 97.8 FL    MCH 26.1 26.1 - 32.9 PG    MCHC 31.2 (L) 31.4 - 35.0 g/dL    RDW 14.6 11.9 - 14.6 %    PLATELET 454 891 - 061 K/uL    MPV 9.3 (L) 9.4 - 12.3 FL    ABSOLUTE NRBC 0.00 0.0 - 0.2 K/uL    DF AUTOMATED      NEUTROPHILS 59 43 - 78 %    LYMPHOCYTES 22 13 - 44 %    MONOCYTES 12 4.0 - 12.0 %    EOSINOPHILS 6 0.5 - 7.8 %    BASOPHILS 1 0.0 - 2.0 %    IMMATURE GRANULOCYTES 1 0.0 - 5.0 %    ABS. NEUTROPHILS 5.0 1.7 - 8.2 K/UL    ABS. LYMPHOCYTES 1.9 0.5 - 4.6 K/UL    ABS. MONOCYTES 1.1 0.1 - 1.3 K/UL    ABS. EOSINOPHILS 0.5 0.0 - 0.8 K/UL    ABS. BASOPHILS 0.1 0.0 - 0.2 K/UL    ABS. IMM. GRANS. 0.1 0.0 - 0.5 K/UL   METABOLIC PANEL, BASIC    Collection Time: 07/28/21  5:19 AM   Result Value Ref Range    Sodium 141 136 - 145 mmol/L    Potassium 4.1 3.5 - 5.1 mmol/L    Chloride 109 (H) 98 - 107 mmol/L    CO2 29 21 - 32 mmol/L    Anion gap 3 (L) 7 - 16 mmol/L    Glucose 84 65 - 100 mg/dL    BUN 13 6 - 23 MG/DL    Creatinine 0.75 0.6 - 1.0 MG/DL    GFR est AA >60 >60 ml/min/1.73m2    GFR est non-AA >60 >60 ml/min/1.73m2    Calcium 8.2 (L) 8.3 - 10.4 MG/DL       All Micro Results     Procedure Component Value Units Date/Time    CULTURE, RESPIRATORY/SPUTUM/BRONCH Chad Ulloa [[id number]] Collected: 07/27/21 1828    Order Status: Completed Specimen: Sputum Updated: 07/28/21 1239     Special Requests: NO SPECIAL REQUESTS        GRAM STAIN 10 TO 16 WBC'S/OIF      0 TO 1 EPITHELIAL CELLS SEEN /OIF            MODERATE GRAM NEGATIVE RODS            FEW GRAM POSITIVE COCCI         4+ MUCUS PRESENT        Culture result:       CULTURE IN PROGRESS,FURTHER UPDATES TO FOLLOW          SARS-COV-2, PCR [[id number]] Collected: 07/27/21 1117    Order Status: Completed Specimen: Nasopharyngeal Updated: 07/28/21 0801     Specimen source Nasopharyngeal        SARS-CoV-2 Not detected        Comment:      The specimen is NEGATIVE for SARS-CoV-2, the novel coronavirus associated with COVID-19. This test has been authorized by the FDA under an Emergency Use Authorization (EUA) for use by authorized laboratories.         Fact sheet for Healthcare Providers: ConventionUpdate.co.nz       Fact sheet for Patients: [facility].co.       Methodology: RT-PCR         CULTURE, BLOOD [[id number]] Collected: 07/27/21 1753    Order Status: Completed Specimen: Blood Updated: 07/28/21 0734     Special Requests: --        LEFT  Antecubital       Culture result: NO GROWTH AFTER 13 HOURS       CULTURE, BLOOD [[id number]] Collected: 07/27/21 2033    Order Status: Completed Specimen: Blood Updated: 07/28/21 0734     Special Requests: --        RIGHT  HAND       Culture result: NO GROWTH AFTER 10 HOURS       RESPIRATORY VIRUS PANEL W/COVID-19, PCR [[id number]]  (Abnormal) Collected: 07/27/21 1351    Order Status: Completed Specimen: Nasopharyngeal Updated: 07/27/21 1706     Adenovirus NOT DETECTED        Coronavirus 229E NOT DETECTED        Coronavirus HKU1 NOT DETECTED        Coronavirus CVNL63 NOT DETECTED        Coronavirus OC43 NOT DETECTED        SARS-CoV-2, PCR NOT DETECTED        Metapneumovirus NOT DETECTED        Rhinovirus and Enterovirus Detected        Influenza A NOT DETECTED        Influenza B NOT DETECTED        Parainfluenza 1 NOT DETECTED        Parainfluenza 2 NOT DETECTED        Parainfluenza 3 NOT DETECTED        Parainfluenza virus 4 NOT DETECTED        RSV by PCR NOT DETECTED        B. parapertussis, PCR NOT DETECTED        Bordetella pertussis - PCR NOT DETECTED        Chlamydophila pneumoniae DNA, QL, PCR NOT DETECTED        Mycoplasma pneumoniae DNA, QL, PCR NOT DETECTED       COVID-19 RAPID TEST [[id number]] Collected: 07/27/21 1117    Order Status: Completed Specimen: Nasopharyngeal Updated: 07/27/21 1154     Specimen source Nasopharyngeal        COVID-19 rapid test Not detected        Comment:      The specimen is NEGATIVE for SARS-CoV-2, the novel coronavirus associated with COVID-19. A negative result does not rule out COVID-19. This test has been authorized by the FDA under an Emergency Use Authorization (EUA) for use by authorized laboratories.         Fact sheet for Healthcare Providers: [employer]cokit  Fact sheet for Patients: [employer].co.nz       Methodology: Isothermal Nucleic Acid Amplification         COVID-19 RAPID TEST [[id number]] Collected: 07/27/21 1117    Order Status: Canceled           EKG Results     None          Other Studies:  CT CHEST W CONT    Result Date: 7/28/2021  CT of the chest with contrast. CLINICAL INDICATION: Hemoptysis, cough PROCEDURE: Serial thin section axial images obtained from the thoracic inlet through the upper abdomen following the administration of 80 cc of Optiray-350 intravenous contrast.  Radiation dose reduction techniques were used for this study. Our CT scanners use one or all of the following: Automated exposure control, adjusted of the mA and/or kV according to patient size, iterative reconstruction COMPARISON:No prior FINDINGS: No mediastinal or axillary adenopathy. There is no pleural or pericardial effusion. Dense airspace consolidation is appreciated with a linear morphology at the caudal aspect of the right upper lobe along the fissure. An atypical infectious or inflammatory process is favored. There are more peripheral groundglass opacities noted anteriorly in the right upper lobe. There is no pleural effusion or pneumothorax. No airspace consolidation evident in the left lung. Limited evaluation of the upper abdomen is unremarkable. No aggressive osseous is identified. 1. Airspace consolidation in the caudal aspect of the right upper lobe with more peripheral anterior hazy groundglass opacities also in the right upper lobe. An atypical infectious or inflammatory process is suspected.        Current Meds:   Current Facility-Administered Medications   Medication Dose Route Frequency    albuterol-ipratropium (DUO-NEB) 2.5 MG-0.5 MG/3 ML  3 mL Nebulization Q6H RT    nicotine (NICODERM CQ) 21 mg/24 hr patch 1 Patch  1 Patch TransDERmal Q24H    [START ON 7/29/2021] dexAMETHasone (DECADRON) tablet 4 mg  4 mg Oral DAILY    escitalopram oxalate (LEXAPRO) tablet 20 mg  20 mg Oral DAILY    famotidine (PEPCID) tablet 20 mg  20 mg Oral BID    fluticasone propionate (FLONASE) 50 mcg/actuation nasal spray 2 Spray  2 Spray Both Nostrils DAILY    cefTRIAXone (ROCEPHIN) 2 g in 0.9% sodium chloride (MBP/ADV) 50 mL MBP  2 g IntraVENous Q24H    doxycycline (VIBRAMYCIN) capsule 100 mg  100 mg Oral Q12H    sodium chloride (NS) flush 5-40 mL  5-40 mL IntraVENous Q8H    sodium chloride (NS) flush 5-40 mL  5-40 mL IntraVENous PRN    acetaminophen (TYLENOL) tablet 650 mg  650 mg Oral Q6H PRN    Or    acetaminophen (TYLENOL) suppository 650 mg  650 mg Rectal Q6H PRN    polyethylene glycol (MIRALAX) packet 17 g  17 g Oral DAILY PRN    ondansetron (ZOFRAN ODT) tablet 4 mg  4 mg Oral Q8H PRN    Or    ondansetron (ZOFRAN) injection 4 mg  4 mg IntraVENous Q6H PRN    enoxaparin (LOVENOX) injection 40 mg  40 mg SubCUTAneous Q12H       Problem List:  Hospital Problems as of 7/28/2021 Date Reviewed: 5/5/2017        Codes Class Noted - Resolved POA    Infection, adenovirus ICD-10-CM: B34.0  ICD-9-CM: 079.0  7/28/2021 - Present Unknown        CAP (community acquired pneumonia) ICD-10-CM: J18.9  ICD-9-CM: 711  7/27/2021 - Present Unknown                 Part of this note may have been written by using a voice dictation software. The note has been proof read but may still contain some grammatical/other typographical errors.     Signed:  Slime Du DO

## 2021-07-29 NOTE — PROGRESS NOTES
RT Evaluation for Home Oxygen    Resting (Room Air)  SpO2: 95  HR: 84      During Walk (Room Air)  SpO2: 91  HR: 125  Walk/Assistance Device: Ambulation  Rate of Dyspnea: Increased RR 20-27  Symptoms:    Comments:        After Walk  SpO2: 93  HR: 112  O2 Device:    O2 Flow Rate (L/min):    FIO2 (%):    Rate of Dyspnea: Regular pattern RR 12-20  Symptoms:    Comments:   No complaints, exited to go home. Additional Comments: pt stated that she could feel when her oxygen drops bellow normal range, she stated she feels really lightheaded.      Electronically signed by Alexey Sher RT on 7/29/2021 at 9:33 AM

## 2021-07-29 NOTE — DISCHARGE SUMMARY
Hospitalist Discharge Summary     Patient ID:  Emerson Tenorio Page  [id number]  31 y.o.  1990  Admit date: 7/27/2021  Discharge date: 7/29/2021  Attending: Otoniel Spencer DO  PCP:  Darline Woods, Not On File  Treatment Team: Attending Provider: Otoniel Spencer DO; Care Manager: Uriah Heck RN; Utilization Review: Kenna Cormier RN; Staff Nurse: Wendy Almaguer RN    Principal Diagnosis Acute respiratory failure with hypoxia [facility])    Hospital Problems as of 7/29/2021 Date Reviewed: 5/5/2017        Codes Class Noted - Resolved POA    GAURANG (obstructive sleep apnea) ICD-10-CM: G47.33  ICD-9-CM: 327.23  7/29/2021 - Present Yes        * (Principal) Acute respiratory failure with hypoxia ([facility] [address] ICD-10-CM: J96.01  ICD-9-CM: 518.81  7/29/2021 - Present Yes        Bacterial pneumonia ICD-10-CM: J15.9  ICD-9-CM: 482.9  7/28/2021 - Present Yes        Rhinovirus ICD-10-CM: B34.8  ICD-9-CM: 079.3  7/28/2021 - Present Yes        CAP (community acquired pneumonia) ICD-10-CM: J18.9  ICD-9-CM: 486  7/27/2021 - Present Yes        Obesity, morbid ([facility] [address] ICD-10-CM: E66.01  ICD-9-CM: 278.01  4/11/2018 - Present Yes        Allergic rhinitis ICD-10-CM: J30.9  ICD-9-CM: 477.9  4/11/2018 - Present Yes        GERD without esophagitis ICD-10-CM: K21.9  ICD-9-CM: 530.81  4/11/2018 - Present Yes        Tobacco dependence ICD-10-CM: F17.200  ICD-9-CM: 305.1  4/11/2018 - Present Yes        Anxiety ICD-10-CM: F41.9  ICD-9-CM: 300.00  10/19/2016 - Present Yes        RESOLVED: Infection, adenovirus ICD-10-CM: B34.0  ICD-9-CM: 079.0  7/28/2021 - 7/28/2021 Yes              Hospital Course:  Please refer to the admission H&P for details of presentation. In summary, the patient is a 31 y. o. female with medical history of  MO, anxiety, tob abuse, allergic rhinitis who presented to ED with report of non productive cough, dyspnea, wheezing for previous 3-4 days.  Has not had covid vaccine as she reports bad reaction to vaccines in the past. She denies sick contacts. No high fevers recorded, subjective chills. She admits to compliance with CPAP and says she \"missed one night and woke up the next morning like this\". ED workup notable for resting o2 sat of 88% on RA. CXR notable for RUL opacity. Admitted for CAP, IV atbx. Resp viral panel pos for rhinovirus as well. She was started on Ceftriaxone and Doxycycline IV. She continued to feel SOB so she was started on Decadron. She slowly improved and came off of oxygen. She had a walk test and stayed >90% with ambulation. She felt better and was discharged home in stable condition. Significant Diagnostic Studies:    Labs: Results:       Chemistry Recent Labs     07/29/21 0458 07/28/21 0519 07/27/21  1117   GLU 92 84 85    141 140   K 3.5 4.1 3.8    109* 108*   CO2 28 29 26   BUN 12 13 13   CREA 0.73 0.75 0.76   CA 8.4 8.2* 8.6   AGAP 6* 3* 6*   AP  --   --  69   TP  --   --  7.6   ALB  --   --  3.7   GLOB  --   --  3.9*   AGRAT  --   --  0.9*      CBC w/Diff Recent Labs     07/29/21 0458 07/28/21 0519 07/27/21  1117   WBC 9.3 8.6 12.9*   RBC 4.51 4.64 5.07   HGB 11.9 12.1 13.5   HCT 37.3 38.8 41.1    273 307   GRANS 67 59 72   LYMPH 19 22 15   EOS 3 6 3      Cardiac Enzymes No results for input(s): CPK, CKND1, JASMIN in the last 72 hours. No lab exists for component: CKRMB, TROIP   Coagulation No results for input(s): PTP, INR, APTT, INREXT in the last 72 hours. Lipid Panel No results found for: CHOL, CHOLPOCT, CHOLX, CHLST, CHOLV, 037568, HDL, HDLP, LDL, LDLC, DLDLP, 487333, VLDLC, VLDL, TGLX, TRIGL, TRIGP, TGLPOCT, CHHD, CHHDX   BNP No results for input(s): BNPP in the last 72 hours.    Liver Enzymes Recent Labs     07/27/21  1117   TP 7.6   ALB 3.7   AP 69      Thyroid Studies Lab Results   Component Value Date/Time    TSH 1.880 05/05/2017 08:57 AM            Imaging:  XR CHEST PA LAT    Result Date: 7/27/2021  Moderate somewhat linear opacity in the right upper lobe which may be atelectasis from mucous plugging or pneumonia. Trace adjacent right pleural fluid. CT CHEST W CONT    Result Date: 7/28/2021  1. Airspace consolidation in the caudal aspect of the right upper lobe with more peripheral anterior hazy groundglass opacities also in the right upper lobe. An atypical infectious or inflammatory process is suspected. Microbiology/Cultures: All Micro Results     Procedure Component Value Units Date/Time    CULTURE, BLOOD [[id number]] Collected: 07/27/21 1753    Order Status: Completed Specimen: Blood Updated: 07/29/21 0716     Special Requests: --        LEFT  Antecubital       Culture result: NO GROWTH 2 DAYS       CULTURE, BLOOD [[id number]] Collected: 07/27/21 2033    Order Status: Completed Specimen: Blood Updated: 07/29/21 0716     Special Requests: --        RIGHT  HAND       Culture result: NO GROWTH 2 DAYS       CULTURE, RESPIRATORY/SPUTUM/BRONCH Claudean Naima STAIN [[id number]] Collected: 07/27/21 1828    Order Status: Completed Specimen: Sputum Updated: 07/28/21 1239     Special Requests: NO SPECIAL REQUESTS        GRAM STAIN 10 TO 16 WBC'S/OIF      0 TO 1 EPITHELIAL CELLS SEEN /OIF            MODERATE GRAM NEGATIVE RODS            FEW GRAM POSITIVE COCCI         4+ MUCUS PRESENT        Culture result:       CULTURE IN PROGRESS,FURTHER UPDATES TO FOLLOW          SARS-COV-2, PCR [[id number]] Collected: 07/27/21 1117    Order Status: Completed Specimen: Nasopharyngeal Updated: 07/28/21 0801     Specimen source Nasopharyngeal        SARS-CoV-2 Not detected        Comment:      The specimen is NEGATIVE for SARS-CoV-2, the novel coronavirus associated with COVID-19. This test has been authorized by the FDA under an Emergency Use Authorization (EUA) for use by authorized laboratories.         Fact sheet for Healthcare Providers: ConventionUpdate.co.nz       Fact sheet for Patients: ConventionUpdate.co.nz       Methodology: RT-PCR RESPIRATORY VIRUS PANEL W/COVID-19, PCR [[id number]]  (Abnormal) Collected: 07/27/21 1351    Order Status: Completed Specimen: Nasopharyngeal Updated: 07/27/21 1706     Adenovirus NOT DETECTED        Coronavirus 229E NOT DETECTED        Coronavirus HKU1 NOT DETECTED        Coronavirus CVNL63 NOT DETECTED        Coronavirus OC43 NOT DETECTED        SARS-CoV-2, PCR NOT DETECTED        Metapneumovirus NOT DETECTED        Rhinovirus and Enterovirus Detected        Influenza A NOT DETECTED        Influenza B NOT DETECTED        Parainfluenza 1 NOT DETECTED        Parainfluenza 2 NOT DETECTED        Parainfluenza 3 NOT DETECTED        Parainfluenza virus 4 NOT DETECTED        RSV by PCR NOT DETECTED        B. parapertussis, PCR NOT DETECTED        Bordetella pertussis - PCR NOT DETECTED        Chlamydophila pneumoniae DNA, QL, PCR NOT DETECTED        Mycoplasma pneumoniae DNA, QL, PCR NOT DETECTED       COVID-19 RAPID TEST [[id number]] Collected: 07/27/21 1117    Order Status: Completed Specimen: Nasopharyngeal Updated: 07/27/21 1154     Specimen source Nasopharyngeal        COVID-19 rapid test Not detected        Comment:      The specimen is NEGATIVE for SARS-CoV-2, the novel coronavirus associated with COVID-19. A negative result does not rule out COVID-19. This test has been authorized by the FDA under an Emergency Use Authorization (EUA) for use by authorized laboratories.         Fact sheet for Healthcare Providers: ConventionUpdate.co.nz  Fact sheet for Patients: ConventionUpdate.co.nz       Methodology: Isothermal Nucleic Acid Amplification         COVID-19 RAPID TEST [[id number]] Collected: 07/27/21 1117    Order Status: Canceled           Discharge Exam:  Visit Vitals  /68 (BP 1 Location: Right lower arm, BP Patient Position: Supine)   Pulse 82   Temp 98.3 °F (36.8 °C)   Resp 18   Ht 5' 4\" (1.626 m)   Wt 113.4 kg (250 lb)   SpO2 90%   BMI 42.91 kg/m² General appearance: alert, cooperative, no distress, appears stated age  Lungs: clear to auscultation bilaterally  Heart: regular rate and rhythm, S1, S2 normal, no murmur, click, rub or gallop  Abdomen: soft, non-tender. Bowel sounds normal. No masses,  no organomegaly  Extremities: no cyanosis or edema  Neurologic: Grossly normal    Disposition: home  Discharge Condition: stable  Patient Instructions:   Current Discharge Medication List      START taking these medications    Details   cefpodoxime (VANTIN) 200 mg tablet Take 1 Tablet by mouth every twelve (12) hours for 5 days. Qty: 10 Tablet, Refills: 0  Start date: 7/29/2021, End date: 8/3/2021      dexAMETHasone (DECADRON) 4 mg tablet Take 4 mg by mouth Daily (before breakfast). Qty: 4 Tablet, Refills: 0  Start date: 7/29/2021      doxycycline (VIBRAMYCIN) 100 mg capsule Take 1 Capsule by mouth every twelve (12) hours for 5 days. Qty: 10 Capsule, Refills: 0  Start date: 7/29/2021, End date: 8/3/2021      guaiFENesin-dextromethorphan (TUSSI-ORGANIDIN DM)  mg/5 mL liqd Take 10 mL by mouth every four (4) hours as needed for Cough or Congestion. Qty: 1 Bottle, Refills: 0  Start date: 7/29/2021         CONTINUE these medications which have CHANGED    Details   escitalopram oxalate (LEXAPRO) 10 mg tablet Take 2 Tablets by mouth daily. 1 every day for anxiety control  Qty: 1 Tablet, Refills: 0  Start date: 7/29/2021    Associated Diagnoses: Anxiety      fluticasone propionate (FLONASE) 50 mcg/actuation nasal spray 2 Sprays by Both Nostrils route daily. Indications: inflammation of the nose due to an allergy  Qty: 1 Bottle, Refills: 0  Start date: 7/29/2021         CONTINUE these medications which have NOT CHANGED    Details   famotidine (PEPCID PO) Take  by mouth. cpap machine kit by Does Not Apply route.       omeprazole (PRILOSEC) 20 mg capsule 1 every day for acid reflux/ulcers  Indications: gastroesophageal reflux disease  Qty: 90 Cap, Refills: 12 Associated Diagnoses: GERD without esophagitis      multivitamin (ONE A DAY) tablet Take 1 Tab by mouth daily. hydrocortisone-pramoxine (ANALPRAM-HC) 2.5-1 % (4g) cream Apply bid to tid prn     Use closest generic option.   Disp large tube  Qty: 1 Tube, Refills: 0         STOP taking these medications       CHROM TENISHA/BRINDAL BERRY (GARCINIA CAMBOGIA PO) Comments:   Reason for Stopping:               Activity: Activity as tolerated  Diet: Regular Diet  Wound Care: None needed    Follow-up  ·   PCP in one week  Time spent to discharge patient 35 minutes  Signed:  Modesto Zuñiga DO  7/29/2021  9:24 AM

## 2021-07-30 LAB
BACTERIA SPEC CULT: NORMAL
GRAM STN SPEC: NORMAL
SERVICE CMNT-IMP: NORMAL

## 2021-08-01 LAB
BACTERIA SPEC CULT: NORMAL
BACTERIA SPEC CULT: NORMAL
SERVICE CMNT-IMP: NORMAL
SERVICE CMNT-IMP: NORMAL